# Patient Record
Sex: MALE | Race: WHITE | NOT HISPANIC OR LATINO | Employment: OTHER | URBAN - METROPOLITAN AREA
[De-identification: names, ages, dates, MRNs, and addresses within clinical notes are randomized per-mention and may not be internally consistent; named-entity substitution may affect disease eponyms.]

---

## 2023-11-29 NOTE — PROGRESS NOTES
PT Evaluation     Today's date: 2023  Patient name: Lani Duval  : 1955  MRN: [id number]  Referring provider: Meredith Storey  Dx:   Encounter Diagnosis     ICD-10-CM    1. Benign paroxysmal positional vertigo, unspecified laterality  H81.10           Start Time: 1500  Stop Time: 1553  Total time in clinic (min): 53 minutes    Assessment  Assessment details:  76 yo male referred to PT for BPPV. Pt with h/o previous BPPV treated by this therapist a few years ago. His sx are described as vertigo when he rolls in bed R>L and when he bends forward and stands up quickly. Pt clinically presents with + R [place] Hallpike indicating R post canalisthiasis BPPV. He does have a lesser/shorter duration response w/ L [place] Hallpike and roll testing. Performed R Epley x 1. Through oculomotor and vestibular assessment, both fixated and unfixated w/ video goggles, pt was noted to potentially have a R UVH as well (+R HIT and +head shaking test w/ L beating nystagmus). Pt would benefit from PT at this time to re-assess response to today's CRT and for possible treatment of any other canal/ further BPPV sx and/or [facility] sx so that he  may confidently return to his PLOF safely and at premorbid intensity and duration.       Other impairment: positional dizziness    Symptom irritability: moderate  Goals  Pt will achieve the following goals in the next 2 weeks  STG 23  Indep with current HEP  Educate patients on safety and how to avoid injury due to disequilibrium or vertigo   Educate patients on vestibular pathology      Pt will achieve the following goals by d/c (8-12 weeks or as stated in plan)  LTG  Indep and compliant with current HEP in order to maximize gains achieved in therapy  Resolve all positional dizziness/BPPV sx in order to resume all activities confidently without limitation  Restore and/or strengthen balance and vestibular strategies including gaze stabilization to decrease disequilibrium and allow for safer and easier daily functional mobility with caring for his wife, household duties, ADLs, and community outings    Plan  Patient would benefit from: skilled physical therapy  Planned therapy interventions: canalith repositioning, neuromuscular re-education, patient education and home exercise program  Other planned therapy interventions: gaze stabilization training prn  Frequency: 2x week  Duration in visits: 8  Duration in weeks: 4  Plan of Care beginning date: 11/30/2023  Plan of Care expiration date: 12/28/2023  Treatment plan discussed with: patient        Subjective Evaluation    History of Present Illness  Date of onset: 11/26/2023  Mechanism of injury: 11/30/23 Eval-pt presented to ED 11/28/23 w/ 2 day h/o of positional vertigo. Dx w/ R post canalisthiasis BPPV and Epley maneuver performed x 2. Pt presents to PT for further eval    Pt initialy felt good after CRT in ED, but dizziness returned the next morning. Notices sx when rolls (he thinks )L when gets oob, resolves fairly quick, but then feels  disequilibrium. [address] [employer] [email or web address] [address], going about daily activities, but avoiding bending forward or certain positions due to hesitancy of sx return. Did experience sx when sleeping turning R. Denies HL/ear fullness;  denies falls  ABC scale 90.63%  Patient Goals  Patient goal: resolved dizziness/spinning when moves in bed  Pain  No pain reported  Exacerbated by: rolling in bed. Social Support  Stairs in house: yes   Lives in: multiple-level home  Lives with: spouse    Employment status: not working  Treatments  Current treatment: physical therapy        Objective     Active Range of Motion   Cervical/Thoracic Spine       Cervical    Flexion: Neck active flexion: WFL. Extension: Neck active extension: WFL. Left lateral flexion: Neck active lateral bend left: min dec. Right lateral flexion: Neck active lateral bend right: min dec. Left rotation: Neck active rotation left: min dec.   Right rotation: Neck active rotation right: min dec.      Neuro Exam:     Oculomotor exam   Oculomotor ROM: WNL and fixated and unfixated w/ video goggles  Resting nystagmus: fixated and unfixated w/ video goggles  Resting nystagmus: not present   Gaze holding nystagmus: not present left  (fixated and unfixated w/ video goggles) and not present right (fixated and unfixated w/ video goggles)  Smooth pursuits: within normal limits  Vertical saccades: normal  Horizontal saccades: hypermetric and to the R  Convergence: L eye surgery at age 6 due to Amblyopia  Convergence: abnormal  Cover test: normal  Head thrust: left normal  Head thrust: right abnormal    Positional testing   Louis-Hallpike   Left posterior canal: symptomatic, torsional and upbeating  Right posterior canal: symptomatic, torsional and upbeating  Roll test   Left horizontal canal: symptomatic  Right horizontal canal: symptomatic  Positional testing comment: Oculomotor Additional Notes:  Head shake test (unfixated with video goggles):  Hor-positive L beating nystagmus  Tragal Pressure-Right-not tested Left-not tested  VOR Cx -normal             Precautions: none       11/30/23            Manuals #1 #2             Try D-H to neutral 1st (Post canal) then below table (ant canal if neg neutral) check reverse upon sit up;                                                      Neuro Re-Ed             Vestibular educa AE            D-H R +  L + less response            Epley X1 R                                                                             Ther Ex                                                                                                                     Ther Activity                                       Gait Training                                       Modalities

## 2023-11-30 ENCOUNTER — EVALUATION (OUTPATIENT)
Age: 68
End: 2023-11-30
Payer: COMMERCIAL

## 2023-11-30 DIAGNOSIS — H81.10 BENIGN PAROXYSMAL POSITIONAL VERTIGO, UNSPECIFIED LATERALITY: Primary | ICD-10-CM

## 2023-11-30 PROCEDURE — 97162 PT EVAL MOD COMPLEX 30 MIN: CPT

## 2023-11-30 PROCEDURE — 97112 NEUROMUSCULAR REEDUCATION: CPT

## 2023-11-30 RX ORDER — AMOXICILLIN 500 MG/1
500 CAPSULE ORAL EVERY 8 HOURS SCHEDULED
COMMUNITY

## 2023-11-30 RX ORDER — ATORVASTATIN CALCIUM 10 MG/1
10 TABLET, FILM COATED ORAL DAILY
COMMUNITY

## 2023-11-30 NOTE — LETTER
2023    [address] [place] [id number]    Patient: Rob Gonzalez   YOB: 1955   Date of Visit: 2023     Encounter Diagnosis     ICD-10-CM    1. Benign paroxysmal positional vertigo, unspecified laterality  H81.10           Dear Dr. Pizarro Duty: Thank you for your recent referral of Rob Gonzalez. Please review the attached evaluation summary from Mike's recent visit. Please verify that you agree with the plan of care by signing the attached order. If you have any questions or concerns, please do not hesitate to call. I sincerely appreciate the opportunity to share in the care of one of your patients and hope to have another opportunity to work with you in the near future. Sincerely,    Kathrin March, PT      Referring Provider:      I certify that I have read the below Plan of Care and certify the need for these services furnished under this plan of treatment while under my care. [address] [place] [id number]  Via Fax: [phone]          PT Evaluation     Today's date: 2023  Patient name: Rob Gonzalez  : 1955  MRN: [id number]  Referring provider: Marimar Everett  Dx:   Encounter Diagnosis     ICD-10-CM    1. Benign paroxysmal positional vertigo, unspecified laterality  H81.10           Start Time: 1500  Stop Time: 1553  Total time in clinic (min): 53 minutes    Assessment  Assessment details:  76 yo male referred to PT for BPPV. Pt with h/o previous BPPV treated by this therapist a few years ago. His sx are described as vertigo when he rolls in bed R>L and when he bends forward and stands up quickly. Pt clinically presents with + R Louis Hallpike indicating R post canalisthiasis BPPV. He does have a lesser/shorter duration response w/ L Louis Hallpike and roll testing. Performed R Epley x 1.   Through oculomotor and vestibular assessment, both fixated and unfixated w/ video goggles, pt was noted to potentially have a R UVH as well (+R HIT and +head shaking test w/ L beating nystagmus). Pt would benefit from PT at this time to re-assess response to today's CRT and for possible treatment of any other canal/ further BPPV sx and/or [facility] sx so that he  may confidently return to his PLOF safely and at premorbid intensity and duration. Other impairment: positional dizziness    Symptom irritability: moderate  Goals  Pt will achieve the following goals in the next 2 weeks  STG 11/30/23  Indep with current HEP  Educate patients on safety and how to avoid injury due to disequilibrium or vertigo   Educate patients on vestibular pathology      Pt will achieve the following goals by d/c (8-12 weeks or as stated in plan)  LTG  Indep and compliant with current HEP in order to maximize gains achieved in therapy  Resolve all positional dizziness/BPPV sx in order to resume all activities confidently without limitation  Restore and/or strengthen balance and vestibular strategies including gaze stabilization to decrease disequilibrium and allow for safer and easier daily functional mobility with caring for his wife, household duties, ADLs, and community outings    Plan  Patient would benefit from: skilled physical therapy  Planned therapy interventions: canalith repositioning, neuromuscular re-education, patient education and home exercise program  Other planned therapy interventions: gaze stabilization training prn  Frequency: 2x week  Duration in visits: 8  Duration in weeks: 4  Plan of Care beginning date: 11/30/2023  Plan of Care expiration date: 12/28/2023  Treatment plan discussed with: patient        Subjective Evaluation    History of Present Illness  Date of onset: 11/26/2023  Mechanism of injury: 11/30/23 Eval-pt presented to ED 11/28/23 w/ 2 day h/o of positional vertigo. Dx w/ R post canalisthiasis BPPV and Epley maneuver performed x 2.   Pt presents to PT for further eval    Pt initialy felt good after CRT in ED, but dizziness returned the next morning. Notices sx when rolls (he thinks )L when gets oob, resolves fairly quick, but then feels  disequilibrium. [address] [employer] driving, going about daily activities, but avoiding bending forward or certain positions due to hesitancy of sx return. Did experience sx when sleeping turning R. Denies HL/ear fullness;  denies falls  ABC scale 90.63%  Patient Goals  Patient goal: resolved dizziness/spinning when moves in bed  Pain  No pain reported  Exacerbated by: rolling in bed. Social Support  Stairs in house: yes   Lives in: multiple-level home  Lives with: spouse    Employment status: not working  Treatments  Current treatment: physical therapy        Objective     Active Range of Motion   Cervical/Thoracic Spine       Cervical    Flexion: Neck active flexion: WFL. Extension: Neck active extension: WFL. Left lateral flexion: Neck active lateral bend left: min dec. Right lateral flexion: Neck active lateral bend right: min dec. Left rotation: Neck active rotation left: min dec. Right rotation: Neck active rotation right: min dec.      Neuro Exam:     Oculomotor exam   Oculomotor ROM: WNL and fixated and unfixated w/ video goggles  Resting nystagmus: fixated and unfixated w/ video goggles  Resting nystagmus: not present   Gaze holding nystagmus: not present left  (fixated and unfixated w/ video goggles) and not present right (fixated and unfixated w/ video goggles)  Smooth pursuits: within normal limits  Vertical saccades: normal  Horizontal saccades: hypermetric and to the R  Convergence: L eye surgery at age 6 due to Amblyopia  Convergence: abnormal  Cover test: normal  Head thrust: left normal  Head thrust: right abnormal    Positional testing   [place]-Hallpike   Left posterior canal: symptomatic, torsional and upbeating  Right posterior canal: symptomatic, torsional and upbeating  Roll test   Left horizontal canal: symptomatic  Right horizontal canal: symptomatic  Positional testing comment: Oculomotor Additional Notes:  Head shake test (unfixated with video goggles):  Hor-positive L beating nystagmus  Tragal Pressure-Right-not tested Left-not tested  VOR Cx -normal             Precautions: none       11/30/23            Manuals #1 #2             Try D-H to neutral 1st (Post canal) then below table (ant canal if neg neutral) check reverse upon sit up;                                                      Neuro Re-Ed             Vestibular educa AE            D-H R +  L + less response            Epley X1 R                                                                             Ther Ex                                                                                                                     Ther Activity                                       Gait Training                                       Modalities

## 2023-12-01 ENCOUNTER — OFFICE VISIT (OUTPATIENT)
Age: 68
End: 2023-12-01
Payer: COMMERCIAL

## 2023-12-01 DIAGNOSIS — H81.10 BENIGN PAROXYSMAL POSITIONAL VERTIGO, UNSPECIFIED LATERALITY: Primary | ICD-10-CM

## 2023-12-01 PROCEDURE — 97112 NEUROMUSCULAR REEDUCATION: CPT

## 2023-12-01 NOTE — PROGRESS NOTES
Daily Note     Today's date: 2023  Patient name: Rob Gonzalez  : 1955  MRN: [id number]  Referring provider: Marimar Everett  Dx:   Encounter Diagnosis     ICD-10-CM    1. Benign paroxysmal positional vertigo, unspecified laterality  H81.10           Start Time: 1052  Stop Time: 1130  Total time in clinic (min): 38 minutes    Subjective: pt reports much worst.  Noted dec R ear hearing today and isn't able to walk steady. Objective: See treatment diary below; negative [place]-Hallpike R and L;  roll test w/ R upbeating torsional nystagmus in R roll only w/ sx. Negative S/L test B as well. Assessment: Pt tolerated today's treatment session fair  -pt very unsteady w/ gait-required supervision in/out of session. Negative postional testing w/ abnormal roll test response (only in R rotation and torsional v. Jcarlos or ageotropic). Due to pt's negative positional testing and new onset of gait unsteadiness, disequilibrium/dizziness, & dec in R hearing (as well as +R HIT and L beating nystagmus w/ unfixated head shake test upon eval), this clinician suspects pt to have labyrinthitis. I phoned Dr. Osman Eng (pt's PCP) and relayed my findings. Dr Osman Eng stated her office would reach out to the pt.     Plan:  await status after pt f/u w/ Dr. Osman Eng     Precautions: none       23           Manuals #1 #2                                                                 Neuro Re-Ed             Vestibular educa AE AE           D-H R +  L + less response negativeB           Epley X1 R -           S/l test R/L - Negative B           Roll test - R upbeathing torsional nystagmus in R rotation/roll only                                                  Ther Ex                                                                                                                     Ther Activity                                       Gait Training                                       Modalities

## 2023-12-05 ENCOUNTER — APPOINTMENT (OUTPATIENT)
Age: 68
End: 2023-12-05
Payer: COMMERCIAL

## 2023-12-05 ENCOUNTER — OFFICE VISIT (OUTPATIENT)
Age: 68
End: 2023-12-05
Payer: COMMERCIAL

## 2023-12-05 DIAGNOSIS — H81.10 BENIGN PAROXYSMAL POSITIONAL VERTIGO, UNSPECIFIED LATERALITY: Primary | ICD-10-CM

## 2023-12-05 PROCEDURE — 97112 NEUROMUSCULAR REEDUCATION: CPT

## 2023-12-05 NOTE — PROGRESS NOTES
Daily Note     Today's date: 2023  Patient name: Antonia Arthur  : 1955  MRN: [id number]  Referring provider: Beni Browne MD  Dx:   Encounter Diagnosis     ICD-10-CM    1. Benign paroxysmal positional vertigo, unspecified laterality  H81.10           Start Time: 1630  Stop Time: 1650  Total time in clinic (min): 20 minutes    Subjective: pt reports feeling better. Stated Dr. Paz Dick (PCP) put pt on 3 day steroid taper last week (finished now). Saw PCP yesterday and given script for meclizine "just in case". Pt also given a script for another med (abx?) that he couldn't rememeber the name and it wasn't noted in pt's chart. Pt referred to  ENT ( appt) for further assessment. Objective: See treatment diary below      Assessment: discussed w/ pt that his sx and response appear indicative of vestibular neuritis (as noted in last treatment note) and that he may still feel residual disequilibrium/floating feeling as well as fatigue while recovering. Pt to return to this clinic next week (to allow for further recovery) for re-assessment for BPPV and/or any residual impacts from this last vestibular episode. Requested pt not take meclizine up to 24 hours prior  to next PT appt unless he is having dizziness.         Plan: Continue per plan of care. -resume next week     Precautions: none       23          Manuals #1 #2 #3                                                                Neuro Re-Ed             Vestibular educa AE AE AE          D-H R +  L + less response negativeB           Epley X1 R -           S/l test R/L - Negative B           Roll test - R upbeathing torsional nystagmus in R rotation/roll only                                                  Ther Ex                                                                                                                     Ther Activity                                       Gait Training Modalities

## 2023-12-08 NOTE — PROGRESS NOTES
Daily Note     Today's date: 2023  Patient name: Mike Villeda  : 1955  MRN: [id number]  Referring provider: Jay Jay Mendoza MD  Dx:   Encounter Diagnosis     ICD-10-CM    1. Benign paroxysmal positional vertigo, unspecified laterality  H81.10                      Subjective: ***      Objective: See treatment diary below      Assessment: Pt tolerated today's treatment session {AE treatment tatum:96141}   Initiated *** today during session and patient education provided on *** .  Pt challenged with ***. Pt completed exer with {AEtreatmentquality:10122}.  Pt felt positive relief of sx with ***.   Pt continues to benefit from skilled PT services. Will continue to encourage HEP and PT attendance while addressing pt's  functional deficits & focusing on progression of POC as patient tolerates.       Plan: {PLAN:[id number]}     Precautions: none       23         Manuals #1 #2 #3 #4               D-H/roll test;  HIT                                                  Neuro Re-Ed             Vestibular educa AE AE AE          D-H R +  L + less response negativeB           Epley X1 R -           S/l test R/L - Negative B           Roll test - R upbeathing torsional nystagmus in R rotation/roll only                                                  Ther Ex                                                                                                                     Ther Activity                                       Gait Training                                       Modalities

## 2023-12-13 ENCOUNTER — OFFICE VISIT (OUTPATIENT)
Age: 68
End: 2023-12-13
Payer: COMMERCIAL

## 2023-12-13 DIAGNOSIS — H81.10 BENIGN PAROXYSMAL POSITIONAL VERTIGO, UNSPECIFIED LATERALITY: Primary | ICD-10-CM

## 2023-12-13 PROCEDURE — 97112 NEUROMUSCULAR REEDUCATION: CPT

## 2023-12-13 NOTE — PROGRESS NOTES
Daily Note     Today's date: 2023  Patient name: Roly Catalan  : 1955  MRN: [id number]  Referring provider: Darryle Reedy, MD  Dx:   Encounter Diagnosis     ICD-10-CM    1. Benign paroxysmal positional vertigo, unspecified laterality  H81.10           Start Time: 947  Stop Time: 1020  Total time in clinic (min): 33 minutes    Subjective: pt reports feeling better, but still has occasional "feeling" when turning his head)      Objective: See treatment diary below;  DVA 6 line difference (ETDRS Chart 1; glasses on in sitting)      Assessment: Pt tolerated today's treatment session well   Negative positional testing for BPPV. Pt w/ 6 line difference w/ DVA testing indicating potential gaze stabilization dysfunction which correlates w/ his subjective complaint. Initiated VOR x 1 hor/vert in sitting x 45" each  today during session and patient education provided on HEP (5x/day) . Pt completed exer with no adverse reactions  . Pt continues to benefit from skilled PT services. Will continue to encourage HEP and PT attendance while addressing pt's  functional deficits & focusing on progression of POC as patient tolerates. Plan: Continue per plan of care. Precautions: none       23         Manuals #1 #2 #3 #4                                                               Neuro Re-Ed             Vestibular educa AE AE AE AE         D-H R +  L + less response negativeB  negative         Epley X1 R -           S/l test R/L - Negative B           Roll test - R upbeathing torsional nystagmus in R rotation/roll only  negative         VOR x 1 hor/vert - - - Sit x 45" ea Stand?                                   Ther Ex                                                                                                                     Ther Activity                                       Gait Training                                       Modalities

## 2023-12-14 ENCOUNTER — APPOINTMENT (OUTPATIENT)
Age: 68
End: 2023-12-14
Payer: COMMERCIAL

## 2023-12-14 DIAGNOSIS — H81.10 BENIGN PAROXYSMAL POSITIONAL VERTIGO, UNSPECIFIED LATERALITY: Primary | ICD-10-CM

## 2023-12-20 NOTE — PROGRESS NOTES
"Daily Note     Today's date: 2023  Patient name: Mike Villeda  : 1955  MRN: [id number]  Referring provider: Jay Jay Mendoza MD  Dx:   Encounter Diagnosis     ICD-10-CM    1. Benign paroxysmal positional vertigo, unspecified laterality  H81.10           Start Time: 0800  Stop Time: 0840  Total time in clinic (min): 40 minutes    Subjective: pt reports he was able to look up at some birds and didn't have any dizziness/sx.  However, reports some slight dizziness x a couple days when turning (L to R) in bed.  Has been doing VOR x 1, but only hor, with his thumbnail, and for ~ 30\" (didn't time)      Objective: See treatment diary below      Assessment: Pt tolerated today's treatment session well    Negative Roll test and [place] Hallpike R/L.  Initiated VOR x 1 hor/vert in standing in addition to sitting. today during session.Pt completed exer with  some inc in sx (lightheadedness/disequilibrium), but recovered w/in a few minutes .  Pt required education/VPs for proper execution of VOR x 1 -including doing it vert which he hadn't been and using a small size, more delineated object.  Pt also instructed to use timer instead of estimating time himself.  Discussed, again, the idea that he is strengthening his vestibular system (for gaze stabilization) and that his sx are a part of that process (as muscle soreness would be when initiating a strengthening program for weak legs).  However, did instruct pt that if sx last >30' after the exer, then he should dec the amount of time performed (I.e. dec to 45\" from 60\").  Pt continues to benefit from skilled PT services. Will continue to encourage HEP and PT attendance while addressing pt's  functional deficits & focusing on progression of POC as patient tolerates.       Plan: Continue per plan of care.      Precautions: none         23        Manuals #1 #2 #3 #4 #5                                                              Neuro " "Re-Ed             Vestibular educa AE AE AE AE AE        D-H R +  L + less response negativeB  negative negative        Epley X1 R -           S/l test R/L - Negative B           Roll test - R upbeathing torsional nystagmus in R rotation/roll only  negative negative        VOR x 1 hor/vert - - - Sit x 45\" ea Stand x60\" elliott                                  Ther Ex                                                                                                                     Ther Activity                                       Gait Training                                       Modalities                                                  " Secondary Defect Width (In Cm): 4

## 2023-12-22 ENCOUNTER — OFFICE VISIT (OUTPATIENT)
Age: 68
End: 2023-12-22
Payer: COMMERCIAL

## 2023-12-22 DIAGNOSIS — H81.10 BENIGN PAROXYSMAL POSITIONAL VERTIGO, UNSPECIFIED LATERALITY: Primary | ICD-10-CM

## 2023-12-22 PROCEDURE — 97112 NEUROMUSCULAR REEDUCATION: CPT

## 2023-12-22 NOTE — PROGRESS NOTES
Daily Note and Discharge     Today's date: 2023  Patient name: Mike Villeda  : 1955  MRN: [id number]  Referring provider: Jay Jay Mendoza MD  Dx:   Encounter Diagnosis     ICD-10-CM    1. Benign paroxysmal positional vertigo, unspecified laterality  H81.10       2. Vestibular hypofunction, unspecified laterality  H83.2X9           Start Time: 1130  Stop Time: 1145  Total time in clinic (min): 15 minutes    Subjective: pt feeling good-no recurrences or even disequilibrium events.  Still able to l/u w/ head w/o sx as well as roll in bed (had to do so suddenly the other night and had no sx).  Still notices R HL, though this has been deteriorating for years.        Objective: See treatment diary below; ABC scale 100%      Assessment: Pt tolerated today's treatment session well  .  W/o further dizziness/disequilibrium issues at this time.  ABC scale 100%.  Pt has achieved all goals.  Discussed w/ pt that he discuss his HL w/ the ENT at his appt in 2024-pt in agreement  Goals  Pt will achieve the following goals in the next 2 weeks  STG 23  Indep with current HEP (met)  Educate patients on safety and how to avoid injury due to disequilibrium or vertigo (met)  Educate patients on vestibular pathology (met)        Pt will achieve the following goals by d/c (8-12 weeks or as stated in plan)  LTG  Indep and compliant with current HEP in order to maximize gains achieved in therapy (met)  Resolve all positional dizziness/BPPV sx in order to resume all activities confidently without limitation (met)  Restore and/or strengthen balance and vestibular strategies including gaze stabilization to decrease disequilibrium and allow for safer and easier daily functional mobility with caring for his wife, household duties, ADLs, and community outings (met)  Plan:  d/c from formal PT -pt in agreement w/ this plan     Precautions: none         23       Manuals #1 #2  "#3 #4 #5                                                              Neuro Re-Ed             Vestibular educa AE AE AE AE AE AE       D-H R +  L + less response negativeB  negative negative        Epley X1 R -           S/l test R/L - Negative B           Roll test - R upbeathing torsional nystagmus in R rotation/roll only  negative negative        VOR x 1 hor/vert - - - Sit x 45\" ea Stand x60\" ea                                  Ther Ex                                                                                                                     Ther Activity                                       Gait Training                                       Modalities                                                    "

## 2023-12-29 ENCOUNTER — OFFICE VISIT (OUTPATIENT)
Age: 68
End: 2023-12-29
Payer: COMMERCIAL

## 2023-12-29 DIAGNOSIS — H81.10 BENIGN PAROXYSMAL POSITIONAL VERTIGO, UNSPECIFIED LATERALITY: Primary | ICD-10-CM

## 2023-12-29 DIAGNOSIS — H83.2X9 VESTIBULAR HYPOFUNCTION, UNSPECIFIED LATERALITY: ICD-10-CM

## 2023-12-29 PROCEDURE — 97112 NEUROMUSCULAR REEDUCATION: CPT

## 2024-07-08 NOTE — PROGRESS NOTES
PT Evaluation     Today's date: 2024  Patient name: Mike Villeda  : 1955  MRN: [id number]  Referring provider: Chantelle Argueta DO  Dx:   Encounter Diagnosis     ICD-10-CM    1. Vertigo  R42           Start Time: 1730  Stop Time:   Total time in clinic (min): 55 minutes    Assessment  Other impairment: dizziness w/ positional changes    Assessment details:  68 yo male referred to PT for BPPV.  Pt with h/o previous labyrinthitis 2023 and also h/o previous R post canalisthiasis BPPV episodes.  His current sx are described as general unsteadiness/disequilibrium w/ walking/mobility after initial vertigo episode getting OOB 2 days ago and 1-2 episodes of dizziness w/ position changes in bed (pt unsure what direction). Pt clinically presents with negative [place]-Hallpike R/L and negative L Roll test position, but + upbeating torsional nystagmus and sx <20 seconds duration w/ R Roll Test position. These signs/sx not definitive for dx which canal, however, due to pt's h/o R post canalisthiasis and the R torsional upbeating nystagmus in cerv R Rot, a preliminary dx of R post canalisthiasis assumed; therefore, performed R Epley x 2. Pt would benefit from PT at this time to re-assess response to today's CRT and for possible treatment of any other canal/ further BPPV sx so that he  may confidently return to his PLOF safely and at premorbid intensity and duration.        Goals  Pt will achieve the following goals in the next 2 weeks  STG 24  Indep with current HEP   Educate patients on safety and how to avoid injury due to disequilibrium or vertigo   Educate patients on vestibular pathology     LTG  Indep and compliant with current HEP in order to maximize gains achieved in therapy   Resolve all positional dizziness/BPPV sx in order to resume all activities confidently without limitation   Restore and/or strengthen balance and vestibular strategies including gaze stabilization to decrease disequilibrium  "and allow for safer and easier daily functional mobility with caring for his wife, household duties, ADLs, and community outings       Plan  Patient would benefit from: skilled physical therapy    Frequency: 1-2x week  Duration in weeks: 6  Plan of Care beginning date: 7/9/2024  Plan of Care expiration date: 8/20/2024  Treatment plan discussed with: patient      Subjective Evaluation    History of Present Illness  Date of onset: 7/7/2024  Mechanism of injury: 7/9/24 Eval- pt known to this PT due to multiple treatments over the years for BPPV related vertigo and labyrinthitis bout ~ December 2023. Pt did f/u w/ ENT following this bout (treated w/ steroid taper by PCP). Pt's ongoing HL was deemed \"normal for his age and he is a hearing aid candidate\".  MRI ordered to rule out acoustic neuroma/abnormality and was negative.    Pt present today with new onset of dizziness.  Pt played [employer] at [employer] last week in the heat.  After last show (7/7/24), awoke during the night and had onset of vertigo when sat up.  Has been out of balance since. Some inc vertigo when in bed, but unsteady with walking.  current sx are described as general unsteadiness/disequilibrium w/ walking/mobility after initial vertigo episode getting OOB 2 days ago and 1-2 episodes of dizziness w/ position changes in bed (pt unsure what direction)Avoided driving this week , but better now. Still needs to hold on at times when walking. Feels yucky  No drastic changes in R ear hearing from baseline last admission as per pt report.    ABC scale 86.88%  Patient Goals  Patient goal: get rid of dizziness; feel better and move safely  Pain  No pain reported    Social Support  Stairs in house: yes   Lives in: multiple-level home  Lives with: spouse (wife is w/c bound)    Employment status: not working      Objective   Neuro Exam:     Oculomotor exam   Oculomotor ROM: WNL and fixated and unfixated w/ video goggles  Resting nystagmus: fixated and " unfixated w/ video goggles  Resting nystagmus: not present   Gaze holding nystagmus: not present left  (fixated and unfixated w/ video goggles) and not present right (fixated and unfixated w/ video goggles)  Smooth pursuits: within normal limits  Vertical saccades: normal  Horizontal saccades: hypermetric and to Right  Head thrust: left normal and right normal    Positional testing   [place]-Hallpike   Left posterior canal: WNL  Right posterior canal: WNL  Roll test   Left horizontal canal: WNL  Roll test comments: sx/signs  only w/ R  Positional testing comment:   Oculomotor Additional Notes:  Head shake test (unfixated with video goggles):  Hor-negative   Tragal Pressure-Right-not tested Left-not tested  VOR Cx -normal             Precautions: R HL       7/9/24            Manuals                                                                 Neuro Re-Ed             Vestibular educ/safety AE            R epley X 2             R upbeating torsional in R roll test position (negative D-H)                                                                Ther Ex                                                                                                                     Ther Activity                                       Gait Training                                       Modalities

## 2024-07-09 ENCOUNTER — EVALUATION (OUTPATIENT)
Age: 69
End: 2024-07-09
Payer: COMMERCIAL

## 2024-07-09 DIAGNOSIS — R42 VERTIGO: Primary | ICD-10-CM

## 2024-07-09 PROCEDURE — 97162 PT EVAL MOD COMPLEX 30 MIN: CPT

## 2024-07-09 PROCEDURE — 97112 NEUROMUSCULAR REEDUCATION: CPT

## 2024-07-09 NOTE — LETTER
July 10, 2024    Chantelle Argueta DO  [address]  [address]  [facility]     Patient: Mike Villeda   YOB: 1955   Date of Visit: 2024     Encounter Diagnosis     ICD-10-CM    1. Vertigo  R42           Dear Dr. Argueta:    Thank you for your recent referral of Mike Villeda. Please review the attached evaluation summary from Mike's recent visit.     Please verify that you agree with the plan of care by signing the attached order.     If you have any questions or concerns, please do not hesitate to call.     I sincerely appreciate the opportunity to share in the care of one of your patients and hope to have another opportunity to work with you in the near future.       Sincerely,    Mamie Ponce, PT      Referring Provider:      I certify that I have read the below Plan of Care and certify the need for these services furnished under this plan of treatment while under my care.                    Chantelle Argueta DO  [address]  [address]  [facility]   Via Fax: [phone]          PT Evaluation     Today's date: 2024  Patient name: Mike Villeda  : 1955  MRN: [id number]  Referring provider: Chantelle Argueta DO  Dx:   Encounter Diagnosis     ICD-10-CM    1. Vertigo  R42           Start Time: 1730  Stop Time:   Total time in clinic (min): 55 minutes    Assessment  Other impairment: dizziness w/ positional changes    Assessment details:  68 yo male referred to PT for BPPV.  Pt with h/o previous labyrinthitis 2023 and also h/o previous R post canalisthiasis BPPV episodes.  His current sx are described as general unsteadiness/disequilibrium w/ walking/mobility after initial vertigo episode getting OOB 2 days ago and 1-2 episodes of dizziness w/ position changes in bed (pt unsure what direction). Pt clinically presents with negative [place]-Hallpike R/L and negative L Roll test position, but + upbeating torsional nystagmus and sx <20 seconds duration w/ R  "Roll Test position. These signs/sx not definitive for dx which canal, however, due to pt's h/o R post canalisthiasis and the R torsional upbeating nystagmus in cerv R Rot, a preliminary dx of R post canalisthiasis assumed; therefore, performed R Epley x 2. Pt would benefit from PT at this time to re-assess response to today's CRT and for possible treatment of any other canal/ further BPPV sx so that he  may confidently return to his PLOF safely and at premorbid intensity and duration.        Goals  Pt will achieve the following goals in the next 2 weeks  STG 7/9/24  Indep with current HEP   Educate patients on safety and how to avoid injury due to disequilibrium or vertigo   Educate patients on vestibular pathology     LTG  Indep and compliant with current HEP in order to maximize gains achieved in therapy   Resolve all positional dizziness/BPPV sx in order to resume all activities confidently without limitation   Restore and/or strengthen balance and vestibular strategies including gaze stabilization to decrease disequilibrium and allow for safer and easier daily functional mobility with caring for his wife, household duties, ADLs, and community outings       Plan  Patient would benefit from: skilled physical therapy    Frequency: 1-2x week  Duration in weeks: 6  Plan of Care beginning date: 7/9/2024  Plan of Care expiration date: 8/20/2024  Treatment plan discussed with: patient      Subjective Evaluation    History of Present Illness  Date of onset: 7/7/2024  Mechanism of injury: 7/9/24 Eval- pt known to this PT due to multiple treatments over the years for BPPV related vertigo and labyrinthitis bout ~ December 2023. Pt did f/u w/ ENT following this bout (treated w/ steroid taper by PCP). Pt's ongoing HL was deemed \"normal for his age and he is a hearing aid candidate\".  MRI ordered to rule out acoustic neuroma/abnormality and was negative.    Pt present today with new onset of dizziness.  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  After last show (7/7/24), awoke during the night and had onset of vertigo when sat up.  Has been out of balance since. Some inc vertigo when in bed, but unsteady with walking.  current sx are described as general unsteadiness/disequilibrium w/ walking/mobility after initial vertigo episode getting OOB 2 days ago and 1-2 episodes of dizziness w/ position changes in bed (pt unsure what direction)Avoided driving this week , but better now. Still needs to hold on at times when walking. Feels yucky  No drastic changes in R ear hearing from baseline last admission as per pt report.    ABC scale 86.88%  Patient Goals  Patient goal: get rid of dizziness; feel better and move safely  Pain  No pain reported    Social Support  Stairs in house: yes   Lives in: multiple-level home  Lives with: spouse (wife is w/c bound)    Employment status: not working      Objective   Neuro Exam:     Oculomotor exam   Oculomotor ROM: WNL and fixated and unfixated w/ video goggles  Resting nystagmus: fixated and unfixated w/ video goggles  Resting nystagmus: not present   Gaze holding nystagmus: not present left  (fixated and unfixated w/ video goggles) and not present right (fixated and unfixated w/ video goggles)  Smooth pursuits: within normal limits  Vertical saccades: normal  Horizontal saccades: hypermetric and to Right  Head thrust: left normal and right normal    Positional testing   Louis-Hallpike   Left posterior canal: WNL  Right posterior canal: WNL  Roll test   Left horizontal canal: WNL  Roll test comments: sx/signs  only w/ R  Positional testing comment:   Oculomotor Additional Notes:  Head shake test (unfixated with video goggles):  Hor-negative   Tragal Pressure-Right-not tested Left-not tested  VOR Cx -normal             Precautions: R HL       7/9/24            Manuals                                                                 Neuro Re-Ed             Vestibular educ/safety AE             R epley X 2             R upbeating torsional in R roll test position (negative D-H)                                                                Ther Ex                                                                                                                     Ther Activity                                       Gait Training                                       Modalities

## 2024-07-10 NOTE — PROGRESS NOTES
"Daily Note     Today's date: 2024  Patient name: Mike Villeda  : 1955  MRN: [id number]  Referring provider: Chantelle Argueta DO  Dx:   Encounter Diagnosis     ICD-10-CM    1. Vertigo  R42           Start Time: 0950  Stop Time: 1030  Total time in clinic (min): 40 minutes    Subjective: had dizziness when lying down supine at dr. [employer] yesterday, but otherwise no severe vertigo.      Objective: See treatment diary below      Assessment: pt w/o signs/sx w/ [place] -Hallpike R/L, deep head hang, s/l test R/L, or Roll Test-however describes he doesn't feel good when turned to the R.  Performed BBQ roll and Kurtzer Hybrid Maneuver for horizonal canal canalisthiasis (R).   pt w/o sx upon completion of session. Pt continues to benefit from skilled PT services. Will continue to encourage PT attendance while addressing pt's  functional deficits & focusing on progression of POC as patient tolerates.       Plan: Continue per plan of care.      Precautions: R HL       24           Manuals #1 #2                                                               Neuro Re-Ed             Vestibular educ/safety AE AE           R epley X 2 -            R upbeating torsional in R roll test position (negative D-H) Negative DH R/L and Roll test, but \"feels it\" on R; R and L s/l test negative           BBQ roll - R to L x 1 (first)           Kurtzer hybrid manuever - (Start R side) x1                                                  Ther Ex                                                                                                                     Ther Activity                                       Gait Training                                       Modalities                                            "

## 2024-07-11 ENCOUNTER — OFFICE VISIT (OUTPATIENT)
Age: 69
End: 2024-07-11
Payer: COMMERCIAL

## 2024-07-11 DIAGNOSIS — R42 VERTIGO: Primary | ICD-10-CM

## 2024-07-11 PROCEDURE — 97112 NEUROMUSCULAR REEDUCATION: CPT

## 2024-07-15 NOTE — PROGRESS NOTES
"Daily Note     Today's date: 2024  Patient name: Mike Villeda  : 1955  MRN: [id number]  Referring provider: Chantelle Argueta DO  Dx:   Encounter Diagnosis     ICD-10-CM    1. Vertigo  R42           Start Time: 1530  Stop Time: 1600  Total time in clinic (min): 30 minutes    Subjective: No symptoms of dizziness since last PT visit.       Objective: See treatment diary below      Assessment: Completed 2x Kurtzer Hybrid maneuver.  Significant vertigo with nystagmus noted in \"position 3\" of Kurtzer maneuver first time.  Second time through maneuver minimal symptoms in position 3. Pt tolerated treatment well, had no vertigo after PT.  Plan to continue PT in 2 weeks following his vacation.    Plan: Progress treatment as tolerated.       Precautions: R HL       24          Manuals #1 #2 #3                                                              Neuro Re-Ed             Vestibular educ/safety AE AE           R epley X 2 -            R upbeating torsional in R roll test position (negative D-H) Negative DH R/L and Roll test, but \"feels it\" on R; R and L s/l test negative -          BBQ roll - R to L x 1 (first) -          Kurtzer hybrid manuever - (Start R side) x1 2x  Start R    Symptoms first roll                                                 Ther Ex                                                                                                                     Ther Activity                                       Gait Training                                       Modalities                                              "

## 2024-07-17 ENCOUNTER — OFFICE VISIT (OUTPATIENT)
Age: 69
End: 2024-07-17
Payer: COMMERCIAL

## 2024-07-17 DIAGNOSIS — R42 VERTIGO: Primary | ICD-10-CM

## 2024-07-17 PROCEDURE — 97112 NEUROMUSCULAR REEDUCATION: CPT | Performed by: PHYSICAL THERAPIST

## 2024-07-18 ENCOUNTER — OFFICE VISIT (OUTPATIENT)
Age: 69
End: 2024-07-18
Payer: COMMERCIAL

## 2024-07-18 DIAGNOSIS — R42 VERTIGO: Primary | ICD-10-CM

## 2024-07-18 PROCEDURE — 97112 NEUROMUSCULAR REEDUCATION: CPT | Performed by: PHYSICAL THERAPIST

## 2024-07-18 NOTE — PROGRESS NOTES
"Daily Note     Today's date: 2024  Patient name: Mike Villeda  : 1955  MRN: [id number]  Referring provider: Chantelle Argueta DO  Dx:   Encounter Diagnosis     ICD-10-CM    1. Vertigo  R42           Start Time: 1440  Stop Time: 1515  Total time in clinic (min): 35 minutes    Subjective: Pt report vertigo when going to bed last night, not currently dizzy, but feels \"disoriented\" today.       Objective: See treatment diary below      Assessment: + roll test R, - roll test L, - Epley R.  Progressed with 2x Kurtzer maneuver. With minimal symptoms.  Pt had vertigo in AM when he woke up this morning.  No vertigo or symptoms with canolith reporitioning today and pt felt well when he left.  Plan to continue PT with re-assess in 2 weeks.     Plan: Progress treatment as tolerated.       Precautions: R HL       24         Manuals #1 #2 #3 #4                                                             Neuro Re-Ed             Vestibular educ/safety AE AE           R epley X 2 -            R upbeating torsional in R roll test position (negative D-H) Negative DH R/L and Roll test, but \"feels it\" on R; R and L s/l test negative - Negative   Epley beginning R      Positive roll test R         BBQ roll - R to L x 1 (first) -          Kurtzer hybrid manuever - (Start R side) x1 2x  Start R    Symptoms first roll 2x R up      1x L up                                                Ther Ex                                                                                                                     Ther Activity                                       Gait Training                                       Modalities                                                "

## 2024-07-25 NOTE — PROGRESS NOTES
"Daily Note     Today's date: 2024  Patient name: Mike Villeda  : 1955  MRN: [id number]  Referring provider: Chantelle Argueta DO  Dx:   Encounter Diagnosis     ICD-10-CM    1. Vertigo  R42           Start Time: 1545  Stop Time: 1613      Subjective:  [place] and needed sutures and went to Urgent Care and while there had dizziness when lying back for PA-C.  Pt states that the PA-C prescribed Sudafed and 5 day prednisone taper  (pt also told to take the meclizine he already had)..  Pt no longer taking meds- stopped taking them b/c they produced fatigue (worried about driving home from vacation under the influence of meds).  Pt reports no dizziness since returning home w/ position changes or activity.      Objective: See treatment diary below      Assessment: Pt tolerated today's treatment session well    Negative positional testing ([place] Hallpike R and L and negative Roll test and deep head hang (unable to provoke sx).  Discussed w/ pt that we hold PT and keep chart open x ~ 2 weeks; if he has any return of sx  he is to call and schedule a return appt.       Plan:  keep chart open x ~ 2 weeks-if pt has further issues he is to call and schedule a return appt     Precautions: R HL       24        Manuals #1 #2 #3 #4 #5                                                              Neuro Re-Ed             Vestibular educ/safety AE AE   AE        R epley X 2 -            R upbeating torsional in R roll test position (negative D-H) Negative DH R/L and Roll test, but \"feels it\" on R; R and L s/l test negative - Negative   Epley beginning R      Positive roll test R Negative D-H R and L; negative roll test and negative deep head hang        BBQ roll - R to L x 1 (first) -          Kurtzer hybrid manuever - (Start R side) x1 2x  Start R    Symptoms first roll 2x R up      1x L up                                                Ther Ex                        "                                                                                              Ther Activity                                       Gait Training                                       Modalities

## 2024-07-30 ENCOUNTER — OFFICE VISIT (OUTPATIENT)
Age: 69
End: 2024-07-30
Payer: COMMERCIAL

## 2024-07-30 DIAGNOSIS — R42 VERTIGO: Primary | ICD-10-CM

## 2024-07-30 PROCEDURE — 97112 NEUROMUSCULAR REEDUCATION: CPT

## 2024-12-24 NOTE — PROGRESS NOTES
PT Evaluation     Today's date: 2024  Patient name: Mike Villeda  : 1955  MRN: [id number]  Referring provider: No ref. provider found  Dx:   Encounter Diagnosis     ICD-10-CM    1. Vestibular hypofunction of left ear  H83.2X2       2. Dizziness  R42           Start Time: 0730  Stop Time: 0840  Total time in clinic (min): 70 minutes    Assessment  Impairments: impaired balance and lacks appropriate home exercise program  Other impairment: vestibular hypofunction; dizziness  Symptom irritability: moderate    Assessment details:  70 yo male Direct Access referral to PT for dizziness that presents as UVH (unilateral vestibular hypofunction).  Pt with h/o previous R post canalisthiasis BPPV-multiple episodes over the years and L vestibular neuritis ~ 1 year ago.  His sx this episode are described as 1 day of vertigo that kept him in bed. Following this, pt w/o dizziness, but increased head mov't can cause lightheadedness and feeling unsteady. Physical exam today revealed +R beating nystagmus fixated and unfixated (w/ video goggles) at rest and with R and L gaze (3rd degree nystagmus w/ direction fixed ).   Pt demonstrated negative positional testing ([place] Hallpike and Roll Tests), normal oculomotor exam except abnormal NPC, abnormal DVA w/ 6 line difference, + L HIT (significant), and poor mCTSIB 4th condition (EC on foam).  Due to the above findings, pt presents as a L UVH (potentially not recovered from L labyrinthitis ~ 1 year ago or new onset this past week due to the fixed visibility of nystagmus in fixed direction indicating acute phase). Due to these findings, as well as from general observation in the clinic today, he doesn't  present as a fall risk currently.  Pt would benefit from PT at this time for gaze stabilization, postural stabilization, balance training, and safety education in order to confidently and safely resume his daily activities at home and in the community  Barriers to therapy:          Goals  Pt will achieve the following goals in the next 2 weeks  STG 12/27/24  Indep with current HEP  Educate patients on safety and how to avoid injury due to disequilibrium or vertigo   Educate patients on vestibular pathology  DVA 5 line difference  Narinder VOR x 1 for 60 seconds TID  hor to improve pt's vestibular endurance      Pt will achieve the following goals by d/c (8-12 weeks or as stated in plan)  LTG  Indep and compliant with current HEP in order to maximize gains achieved in therapy  Dec episodes of dizziness to <2x/week  Improve DHI score to 10 to indicate improved dizziness impact on pt's daily life  DVA 3-4 line difference to denote gross gaze stabilization improvement  Restore and/or strengthen balance, gait, endurance, functional strength, and vestibular strategies including gaze stabilization as noted through mCTSIB  in order to decrease disequilibrium and allow for safer and easier daily functional mobility with caring for his wife, household duties, ADLs, and community outings    Plan  Patient would benefit from: skilled physical therapy  Referral necessary: No    Planned therapy interventions: canalith repositioning, neuromuscular re-education, patient/caregiver education, balance and postural training  Other planned therapy interventions: gaze/postural stabilization training prn    Frequency: 1-2x week  Duration in weeks: 8  Plan of Care beginning date: 12/27/2024  Plan of Care expiration date: 2/21/2025  Treatment plan discussed with: patient        Subjective Evaluation    History of Present Illness  Date of onset: 12/24/2024  Mechanism of injury: 12/27/24 Eval- pt known to this clinic due to Pt with h/o previous R labyrinthitis 12/2023 and also h/o previous R post canalisthiasis BPPV episodes. (Pt seen multiple times over the years).  Pt states on Tuesday 12/24/24 he awoke and had onset of dizziness as he has had before.  Pt states he was extremely fatigued and feeling terrible-slept  most of that day.  Did not get dizzy next day when got up/oob.  Did fine that day. Felt off next day, but able to perform tasks and didn't have dizziness.  Currently w/o dizziness  (States wife was dizzy and vomited in past few days as well).  No change in hearing  Hasn't taken an meds for dizziness    DHI 14  ABC 96.88%  Patient Goals  Patient goal: resolve dizziness  Pain  No pain reported  Exacerbated by: increased head mov't.    Social Support  Stairs in house: yes   Lives in: multiple-level home  Lives with: spouse (wife is w/c bound)    Employment status: not working  Treatments  Current treatment: physical therapy        Objective   Neuro Exam:     Oculomotor exam   Oculomotor ROM: WNL and fixated and unfixated w/ video goggles  Resting nystagmus: present and R beating; fixated and unfixated w/ video goggles  Gaze holding nystagmus: present left (R beating less intense than R gaze; fixated and unfixated w/ video goggles) and present right (R beating; fixated and unfixated w/ video goggles)  Smooth pursuits: within normal limits and fixated and unfixated w/ video goggles  Vertical saccades: hypermetric  Horizontal saccades: normal  Convergence: abnormal (h/o vision issues w/ L eye which is eye that deviates)  Cover test: R lateral mov't; pt reports weak L eye  Cover test: abnormal  Head thrust: right normal  Head thrust: left abnormal  Dynamic visual acuity: abnormal (ETDRS chart R; sitting; glasses on 6 line difference)    Positional testing   Positional testing comment: MCTSIB performed with shoes on, feet together, and arms crossed at chest    Oculomotor Additional Notes:  Head shake test (unfixated with video goggles):  Hor-negative   Tragal Pressure-Right-not tested Left-not tested  VOR Cx -normal    [place] Hallpike and Roll test all w/ + R beating nystagmus and min to no sx (mild dizziness, not vertigo)    Functional outcomes   Functional outcome gait comment: amb indep without ADx clinic distances and  "parking lot to clinic. Pt without gait deviations noted       Balance assessments   MCTSIB   Eyes open level surface: 30 sec  min sway  Eyes open foam surface: 30 sec min sway  Eyes closed level surface: 30 sec min to mod sway  Eyes closed foam surface: 4, 4, 5 sec mod to max sway             Precautions: R HL        12/27/24            Manuals #1                                                                Neuro Re-Ed             Safety; Posture, body mechanics, energy/joint conservation; vestibular pathology educ AE            Walk w/ HM -            Stand floor:  -feet together /EC -            Stand foam:  -feet together/EO -                         VOR x 1  Hor sitting x60\" (TID) Add [employer] to 5x/day                        Pencil push ups -                                      Ther Ex                                                                                                                     Ther Activity                                       Gait Training                                       Modalities                                            "

## 2024-12-27 ENCOUNTER — EVALUATION (OUTPATIENT)
Age: 69
End: 2024-12-27
Payer: COMMERCIAL

## 2024-12-27 DIAGNOSIS — H83.2X2 VESTIBULAR HYPOFUNCTION OF LEFT EAR: Primary | ICD-10-CM

## 2024-12-27 DIAGNOSIS — R42 DIZZINESS: ICD-10-CM

## 2024-12-27 DIAGNOSIS — H81.10 BENIGN PAROXYSMAL POSITIONAL VERTIGO, UNSPECIFIED LATERALITY: ICD-10-CM

## 2024-12-27 PROCEDURE — 97112 NEUROMUSCULAR REEDUCATION: CPT

## 2024-12-27 PROCEDURE — 97162 PT EVAL MOD COMPLEX 30 MIN: CPT

## 2024-12-27 NOTE — LETTER
2024    Chantelle Argueta DO  [address]  [address]  [facility]     Patient: Mike Villeda   YOB: 1955   Date of Visit: 2024     Encounter Diagnosis     ICD-10-CM    1. Vestibular hypofunction of left ear  H83.2X2       2. Dizziness  R42           Dear Dr. Argueta:    Thank you for your recent referral of Mike Villeda. Please review the attached evaluation summary from Mike's recent visit.     Please verify that you agree with the plan of care by signing the attached order.     If you have any questions or concerns, please do not hesitate to call.     I sincerely appreciate the opportunity to share in the care of one of your patients and hope to have another opportunity to work with you in the near future.       Sincerely,    Mamie Ponce, PT      Referring Provider:      I certify that I have read the below Plan of Care and certify the need for these services furnished under this plan of treatment while under my care.                    Chantelle Argueta DO  [address]  [address]  [facility]   Via Fax: [phone]          PT Evaluation     Today's date: 2024  Patient name: Mike Villeda  : 1955  MRN: [id number]  Referring provider: No ref. provider found  Dx:   Encounter Diagnosis     ICD-10-CM    1. Vestibular hypofunction of left ear  H83.2X2       2. Dizziness  R42           Start Time: 0730  Stop Time: 0840  Total time in clinic (min): 70 minutes    Assessment  Impairments: impaired balance and lacks appropriate home exercise program  Other impairment: vestibular hypofunction; dizziness  Symptom irritability: moderate    Assessment details:  68 yo male Direct Access referral to PT for dizziness that presents as UVH (unilateral vestibular hypofunction).  Pt with h/o previous R post canalisthiasis BPPV-multiple episodes over the years and L vestibular neuritis ~ 1 year ago.  His sx this episode are described as 1 day of vertigo that  kept him in bed. Following this, pt w/o dizziness, but increased head mov't can cause lightheadedness and feeling unsteady. Physical exam today revealed +R beating nystagmus fixated and unfixated (w/ video goggles) at rest and with R and L gaze (3rd degree nystagmus w/ direction fixed ).   Pt demonstrated negative positional testing ([place] Hallpike and Roll Tests), normal oculomotor exam except abnormal NPC, abnormal DVA w/ 6 line difference, + L HIT (significant), and poor mCTSIB 4th condition (EC on foam).  Due to the above findings, pt presents as a L UVH (potentially not recovered from L labyrinthitis ~ 1 year ago or new onset this past week due to the fixed visibility of nystagmus in fixed direction indicating acute phase). Due to these findings, as well as from general observation in the clinic today, he doesn't  present as a fall risk currently.  Pt would benefit from PT at this time for gaze stabilization, postural stabilization, balance training, and safety education in order to confidently and safely resume his daily activities at home and in the community  Barriers to therapy:         Goals  Pt will achieve the following goals in the next 2 weeks  STG 12/27/24  Indep with current HEP  Educate patients on safety and how to avoid injury due to disequilibrium or vertigo   Educate patients on vestibular pathology  DVA 5 line difference  Narinder VOR x 1 for 60 seconds TID  hor to improve pt's vestibular endurance      Pt will achieve the following goals by d/c (8-12 weeks or as stated in plan)  LTG  Indep and compliant with current HEP in order to maximize gains achieved in therapy  Dec episodes of dizziness to <2x/week  Improve DHI score to 10 to indicate improved dizziness impact on pt's daily life  DVA 3-4 line difference to denote gross gaze stabilization improvement  Restore and/or strengthen balance, gait, endurance, functional strength, and vestibular strategies including gaze stabilization as noted through  mCTSIB  in order to decrease disequilibrium and allow for safer and easier daily functional mobility with caring for his wife, household duties, ADLs, and community outings    Plan  Patient would benefit from: skilled physical therapy  Referral necessary: No    Planned therapy interventions: canalith repositioning, neuromuscular re-education, patient/caregiver education, balance and postural training  Other planned therapy interventions: gaze/postural stabilization training prn    Frequency: 1-2x week  Duration in weeks: 8  Plan of Care beginning date: 12/27/2024  Plan of Care expiration date: 2/21/2025  Treatment plan discussed with: patient        Subjective Evaluation    History of Present Illness  Date of onset: 12/24/2024  Mechanism of injury: 12/27/24 Eval- pt known to this clinic due to Pt with h/o previous R labyrinthitis 12/2023 and also h/o previous R post canalisthiasis BPPV episodes. (Pt seen multiple times over the years).  Pt states on Tuesday 12/24/24 he awoke and had onset of dizziness as he has had before.  Pt states he was extremely fatigued and feeling terrible-slept most of that day.  Did not get dizzy next day when got up/oob.  Did fine that day. Felt off next day, but able to perform tasks and didn't have dizziness.  Currently w/o dizziness  (States wife was dizzy and vomited in past few days as well).  No change in hearing  Hasn't taken an meds for dizziness    DHI 14  ABC 96.88%  Patient Goals  Patient goal: resolve dizziness  Pain  No pain reported  Exacerbated by: increased head mov't.    Social Support  Stairs in house: yes   Lives in: multiple-level home  Lives with: spouse (wife is w/c bound)    Employment status: not working  Treatments  Current treatment: physical therapy        Objective   Neuro Exam:     Oculomotor exam   Oculomotor ROM: WNL and fixated and unfixated w/ video goggles  Resting nystagmus: present and R beating; fixated and unfixated w/ video goggles  Gaze holding  "nystagmus: present left (R beating less intense than R gaze; fixated and unfixated w/ video goggles) and present right (R beating; fixated and unfixated w/ video goggles)  Smooth pursuits: within normal limits and fixated and unfixated w/ video goggles  Vertical saccades: hypermetric  Horizontal saccades: normal  Convergence: abnormal (h/o vision issues w/ L eye which is eye that deviates)  Cover test: R lateral mov't; pt reports weak L eye  Cover test: abnormal  Head thrust: right normal  Head thrust: left abnormal  Dynamic visual acuity: abnormal (ETDRS chart R; sitting; glasses on 6 line difference)    Positional testing   Positional testing comment: MCTSIB performed with shoes on, feet together, and arms crossed at chest    Oculomotor Additional Notes:  Head shake test (unfixated with video goggles):  Hor-negative   Tragal Pressure-Right-not tested Left-not tested  VOR Cx -normal    [place] Hallpike and Roll test all w/ + R beating nystagmus and min to no sx (mild dizziness, not vertigo)    Functional outcomes   Functional outcome gait comment: amb indep without ADx clinic distances and parking lot to clinic. Pt without gait deviations noted       Balance assessments   MCTSIB   Eyes open level surface: 30 sec  min sway  Eyes open foam surface: 30 sec min sway  Eyes closed level surface: 30 sec min to mod sway  Eyes closed foam surface: 4, 4, 5 sec mod to max sway             Precautions: R HL        12/27/24            Manuals #1                                                                Neuro Re-Ed             Safety; Posture, body mechanics, energy/joint conservation; vestibular pathology educ AE            Walk w/ HM -            Stand floor:  -feet together /EC -            Stand foam:  -feet together/EO -                         VOR x 1  Hor sitting x60\" (TID) Add [employer] inc to 5x/day                        Pencil push ups -                                      Ther Ex                                          "                                                                            Ther Activity                                       Gait Training                                       Modalities

## 2024-12-31 ENCOUNTER — OFFICE VISIT (OUTPATIENT)
Age: 69
End: 2024-12-31
Payer: COMMERCIAL

## 2024-12-31 DIAGNOSIS — H81.10 BENIGN PAROXYSMAL POSITIONAL VERTIGO, UNSPECIFIED LATERALITY: ICD-10-CM

## 2024-12-31 DIAGNOSIS — H83.2X2 VESTIBULAR HYPOFUNCTION OF LEFT EAR: ICD-10-CM

## 2024-12-31 DIAGNOSIS — R42 DIZZINESS: Primary | ICD-10-CM

## 2024-12-31 PROCEDURE — 97112 NEUROMUSCULAR REEDUCATION: CPT

## 2024-12-31 NOTE — PROGRESS NOTES
"Daily Note     Today's date: 2024  Patient name: Mike Villeda  : 1955  MRN: [id number]  Referring provider: Chantelle Argueta DO  Dx:   Encounter Diagnosis     ICD-10-CM    1. Dizziness  R42       2. Vestibular hypofunction of left ear  H83.2X2       3. Benign paroxysmal positional vertigo, unspecified laterality  H81.10           Start Time: 0940  Stop Time: 1015  Total time in clinic (min): 35 minutes    Subjective: pt reports that last 2 days  getting oob has been dizzy, but no other instances.  Feels off in general and very fatigue despite getting a good night sleep      Objective: See treatment diary below; supine to sit from L w/ + dizziness, from R w/ -dizziness-no nystagmus noted fixated      Assessment: Pt tolerated today's treatment session fair . Initiated VOR x 1 vert and walking w/ head moving today during session and patient education provided on HEP .  Pt challenged with walking w/ head moving. Pt completed exer with no adverse reactions  .  Discussed moving slower when getting oob.   Pt continues to benefit from skilled PT services. Will continue to encourage HEP and PT attendance while addressing pt's  functional deficits & focusing on progression of POC as patient tolerates.       Plan: Continue per plan of care.      Precautions: R HL        24           Manuals #1 #2                                                               Neuro Re-Ed             Safety; Posture, body mechanics, energy/joint conservation; vestibular pathology educ AE AE           Epley  - L x 1           Walk w/ HM (hor/vert) - 1 lap (~125') ea            Stand floor:  -feet together /EC - -           Stand foam:  -feet together/EO - -                        VOR x 1  Hor sitting x60\" (TID) Hor/vert 60\" ea sitting                         Pencil push ups - -                                     Ther Ex                                                                                               "                       Ther Activity                                       Gait Training                                       Modalities

## 2024-12-31 NOTE — PROGRESS NOTES
"Daily Note     Today's date: 2025  Patient name: Mike Villeda  : 1955  MRN: [id number]  Referring provider: Chantelle Argueta DO  Dx:   Encounter Diagnosis     ICD-10-CM    1. Vestibular hypofunction of left ear  H83.2X2       2. Benign paroxysmal positional vertigo, unspecified laterality  H81.10       3. Dizziness  R42           Start Time: 1038  Stop Time: 1123  Total time in clinic (min): 45 minutes    Subjective: pt had to get oob quickly this a.m. (to answer phone) and had sig dizziness (VAS 6/10).  Later when got oob had dizziness, but moved slower and it wasn't as severe (2-4/10) Pt reports he has been forgetting appts (PT and other places)-is getting worried about that      Objective: See treatment diary below; FGA       Assessment: Pt tolerated today's treatment session well    Educated pt and initiated VAS rating of sx today during session .  Assessed FGA today : .  Pt challenged w/ HM vert mostly (VOR x 1 and sitting trunk forward flex/return).Also challenged w/ foam/feet together HM.  No difficulty w/ standing 180 deg turns. Pt completed exer with no adverse reactions  .  Pt continues to benefit from skilled PT services. Will continue to encourage HEP and PT attendance while addressing pt's  functional deficits & focusing on progression of POC as patient tolerates.   Plan: Continue per plan of care.      Precautions: R HL        24          Manuals #1 #2 #3             VAS dizzy 2/10 w/ VOR x 1 vert                                                 Neuro Re-Ed             Safety; Posture, body mechanics, energy/joint conservation; vestibular pathology educ AE AE AE VAS          Epley  - L x 1 - -         Walk w/ HM (hor/vert) - 1 lap (~125') ea  1 lap (~125') ea           Stand floor feet together EC:  -static  -HM hor  -HM vert - -     30\"  X10  x10          Stand foam feet together/EO:  -static  -HM hor  -HM vert - -     X30\"  -x10  x10          Sit " "forward bends - - 2x5 (1,2 count)-sx w/ first set only          VOR x 1  Hor sitting x60\" (TID) Hor/vert 60\" ea sitting  Hor/vert 60\" ea sitting-sx mostly vert  Stand/busy background         180 deg turns - - 2x30\"-no sx -d/c         Pencil push ups - - X60\" (HEP)                                    Ther Ex                                                                                                                     Ther Activity                                       Gait Training                                       Modalities                                              "

## 2025-01-02 ENCOUNTER — OFFICE VISIT (OUTPATIENT)
Age: 70
End: 2025-01-02
Payer: COMMERCIAL

## 2025-01-02 DIAGNOSIS — H83.2X2 VESTIBULAR HYPOFUNCTION OF LEFT EAR: Primary | ICD-10-CM

## 2025-01-02 DIAGNOSIS — R42 DIZZINESS: ICD-10-CM

## 2025-01-02 DIAGNOSIS — H81.10 BENIGN PAROXYSMAL POSITIONAL VERTIGO, UNSPECIFIED LATERALITY: ICD-10-CM

## 2025-01-02 PROCEDURE — 97112 NEUROMUSCULAR REEDUCATION: CPT

## 2025-01-02 NOTE — PROGRESS NOTES
Daily Note     Today's date: 2025  Patient name: Mike Villeda  : 1955  MRN: [id number]  Referring provider: Chantelle Argueta DO  Dx:   Encounter Diagnosis     ICD-10-CM    1. Vestibular hypofunction of left ear  H83.2X2       2. Dizziness  R42       3. Benign paroxysmal positional vertigo, unspecified laterality  H81.10           Start Time: 1455  Stop Time: 1545  Total time in clinic (min): 50 minutes    Subjective: pt reports still lightheaded w/ looking up/down or quickly getting up from bed.  Not all the time, and, overall, to a lesser degree.  When it occurs, it is when moving head up/down but it doesn't always occur when pt does that      Objective: See treatment diary below      Assessment: Pt tolerated today's treatment session well   Assess pt tatum to busy background with alphabet find today and pt w/o inc sx.  Also initiated continuous R s/l <-->sit<-->L s/l w/ min provocation of sx. Pt still challenged w/ NPC as noted w/ pencil pushups and to cont w/ this exer.  Pt given standing on foam/pillow HM exer at home (w/ sturdy table or countertop next to pt for safety).Pt completed exer with no adverse reactions  . Min to no provocation of sx today (2/10 at worst w/ walking HM hor).   Pt continues to benefit from skilled PT services. Will continue to encourage HEP and PT attendance while addressing pt's  functional deficits & focusing on progression of POC as patient tolerates.       Plan: Continue per plan of care.      Precautions: R HL        24         Manuals #1 #2 #3 #4            VAS dizzy 2/10 w/ VOR x 1 vert VAS 1-2/10 throughout                                                Neuro Re-Ed             Safety; Posture, body mechanics, energy/joint conservation; vestibular pathology educ AE AE AE VAS AE         Epley  - L x 1 - -         Walk w/ HM (hor/vert) - 1 lap (~125') ea  1 lap (~125') ea  1 lap (~125') ea          Stand floor feet together  "EC:  -static  -HM hor  -HM vert - -     30\"  X10  x10     30\"  X10  x10         Stand foam feet together/EO:  -static  -HM hor  -HM vert - -     X30\"  -x10  x10       X30\"  x10  X10  challenging         Sit forward bends - - 2x5 (1,2 count)-sx w/ first set only x5         VOR x 1  Hor sitting x60\" (TID) Hor/vert 60\" ea sitting  Hor/vert 60\" ea sitting-sx mostly vert  Stand/busy background 60\" ea Walk f/b        Busy background alphabet find - - - X5' no sx         180 deg turns - - 2x30\"-no sx -d/c         Pencil push ups - - X60\" (HEP) 2 X60\"          R s/l <-->sit<--> L s/l - - - X5  - - - - -                 Ther Ex                                                                                                                     Ther Activity                                       Gait Training                                       Modalities                                                "

## 2025-01-07 ENCOUNTER — OFFICE VISIT (OUTPATIENT)
Age: 70
End: 2025-01-07
Payer: COMMERCIAL

## 2025-01-07 DIAGNOSIS — H83.2X2 VESTIBULAR HYPOFUNCTION OF LEFT EAR: Primary | ICD-10-CM

## 2025-01-07 DIAGNOSIS — R42 DIZZINESS: ICD-10-CM

## 2025-01-07 DIAGNOSIS — H81.10 BENIGN PAROXYSMAL POSITIONAL VERTIGO, UNSPECIFIED LATERALITY: ICD-10-CM

## 2025-01-07 PROCEDURE — 97112 NEUROMUSCULAR REEDUCATION: CPT

## 2025-01-07 NOTE — PROGRESS NOTES
Daily Note     Today's date: 2025  Patient name: Mike Villeda  : 1955  MRN: [id number]  Referring provider: Chantelle Argueta DO  Dx:   Encounter Diagnosis     ICD-10-CM    1. Vestibular hypofunction of left ear  H83.2X2       2. Dizziness  R42       3. Benign paroxysmal positional vertigo, unspecified laterality  H81.10           Start Time: 1445  Stop Time: 1525  Total time in clinic (min): 40 minutes    Subjective: pt reports he went to dentist yesterday and when he got up from the chair had inc/more intense dizziness (no vertigo and able to drive home).  Went home to bed and slept and then slept well last night (until 11 am today).        Objective: R s/l test + mild nystagmus and sx upon sitting up;  L s/l test negative .  Negative roll test or Louis hallpike fixated.  Video goggles pt w/ ? Direction changing nystagmus v. R or L beating nystagmus (hor) only.  Negative VBI testing.     Assessment: Initially ? BPPV following pt's incidence w/ dentist yesterday (and pt's h/o BPPV).  Though positional testing all negative (Roll test, Louis Hallpike and S/L Test) Pt tolerated today's treatment session fair  Pt very fatigued w/ testing.  ? Direction changing nystagmus observed w/ video goggles and/or fixated in room light.  ? Central issue? Pt instructed to contact Pcp, Dr. Argueta, for further assessment.  Phoned dr. Argueta's office and discussed pt w/ nursing staff who were to relate w/ Dr. Argueta.        Plan: await results of further assessment w/ pt's PCP     Precautions: R HL        24        Manuals #1 #2 #3 #4 #5           VAS dizzy 2/10 w/ VOR x 1 vert VAS 1-2/10 throughout                                                Neuro Re-Ed             Safety; Posture, body mechanics, energy/joint conservation; vestibular pathology educ AE AE AE VAS AE AE        Epley  - L x 1 - -         Walk w/ HM (hor/vert) - 1 lap (~125') ea  1 lap (~125') ea  1 lap (~125') ea         "  Stand floor feet together EC:  -static  -HM hor  -HM vert - -     30\"  X10  x10     30\"  X10  x10         Stand foam feet together/EO:  -static  -HM hor  -HM vert - -     X30\"  -x10  x10       X30\"  x10  X10  challenging         Sit forward bends - - 2x5 (1,2 count)-sx w/ first set only x5         VOR x 1  Hor sitting x60\" (TID) Hor/vert 60\" ea sitting  Hor/vert 60\" ea sitting-sx mostly vert  Stand/busy background 60\" ea Walk f/b        Busy background alphabet find - - - X5' no sx         180 deg turns - - 2x30\"-no sx -d/c         Pencil push ups - - X60\" (HEP) 2 X60\"          R s/l <-->sit<--> L s/l - - - X5  - - - - -                 Ther Ex                                                                                                                     Ther Activity                                       Gait Training                                       Modalities                                                  "

## 2025-01-08 ENCOUNTER — APPOINTMENT (OUTPATIENT)
Age: 70
End: 2025-01-08
Payer: COMMERCIAL

## 2025-01-09 ENCOUNTER — OFFICE VISIT (OUTPATIENT)
Age: 70
End: 2025-01-09
Payer: COMMERCIAL

## 2025-01-09 DIAGNOSIS — H81.10 BENIGN PAROXYSMAL POSITIONAL VERTIGO, UNSPECIFIED LATERALITY: ICD-10-CM

## 2025-01-09 DIAGNOSIS — R42 DIZZINESS: ICD-10-CM

## 2025-01-09 DIAGNOSIS — H83.2X2 VESTIBULAR HYPOFUNCTION OF LEFT EAR: Primary | ICD-10-CM

## 2025-01-09 PROCEDURE — 97112 NEUROMUSCULAR REEDUCATION: CPT

## 2025-01-14 ENCOUNTER — APPOINTMENT (OUTPATIENT)
Age: 70
End: 2025-01-14
Payer: COMMERCIAL

## 2025-01-16 ENCOUNTER — APPOINTMENT (OUTPATIENT)
Age: 70
End: 2025-01-16
Payer: COMMERCIAL

## 2025-01-21 ENCOUNTER — APPOINTMENT (OUTPATIENT)
Age: 70
End: 2025-01-21
Payer: COMMERCIAL

## 2025-01-23 ENCOUNTER — APPOINTMENT (OUTPATIENT)
Age: 70
End: 2025-01-23
Payer: COMMERCIAL

## 2025-06-12 ENCOUNTER — EVALUATION (OUTPATIENT)
Age: 70
End: 2025-06-12
Payer: COMMERCIAL

## 2025-06-12 DIAGNOSIS — M54.2 CERVICALGIA: Primary | ICD-10-CM

## 2025-06-12 DIAGNOSIS — M79.601 RIGHT ARM PAIN: ICD-10-CM

## 2025-06-12 PROCEDURE — 97112 NEUROMUSCULAR REEDUCATION: CPT

## 2025-06-12 PROCEDURE — 97161 PT EVAL LOW COMPLEX 20 MIN: CPT

## 2025-06-12 PROCEDURE — 97140 MANUAL THERAPY 1/> REGIONS: CPT

## 2025-06-12 NOTE — LETTER
2025    Chantelle Argueta DO  [address]  [address]  [facility]     Patient: Mike Villeda   YOB: 1955   Date of Visit: 2025     Encounter Diagnosis     ICD-10-CM    1. Cervicalgia  M54.2       2. Right arm pain  M79.601           Dear Dr. Chantelle Argueta, DO:    Thank you for your recent referral of Mike Villeda. Please review the attached evaluation summary from Mike's recent visit.     Please verify that you agree with the plan of care by signing the attached order.     If you have any questions or concerns, please do not hesitate to call.     I sincerely appreciate the opportunity to share in the care of one of your patients and hope to have another opportunity to work with you in the near future.       Sincerely,    Oksana Ch, PT      Referring Provider:      I certify that I have read the below Plan of Care and certify the need for these services furnished under this plan of treatment while under my care.                    Chantelle Argueta DO  [address]  [address]  [facility]   Via Fax: [phone]          PT Evaluation     Today's date: 2025  Patient name: Mike Villeda  : 1955  MRN: [id number]  Referring provider: Chantelle Argueta DO  Dx:   Encounter Diagnosis     ICD-10-CM    1. Cervicalgia  M54.2       2. Right arm pain  M79.601           Start Time: 1400  Stop Time: 1500  Total time in clinic (min): 60 minutes    Assessment  Impairments: abnormal or restricted ROM, activity intolerance, impaired physical strength, lacks appropriate home exercise program, pain with function, poor posture , participation limitations, activity limitations and endurance    Assessment details: Mike is a 70 y.o. male who presents to outpatient physical therapy with signs and symptoms consistent with cervical radiculopathy and R shoulder pain. Pt presents with poor posture as observed by thoracic kyphosis, excessive cervical protraction,  rounded shoulders. Posture was not modifiable. Cervical ROM had mild to moderate limitations with end range pain into lateral flexion and cervical rotation primarily on the R side. B/L shoulder ROM was WFL b/l however, quality of movement was poor into OH movement, painful with IR BTH on the R UE. Observed scapular winging with shoulder ABD at 90* and above 90*. Pt was tender to palpate along the posterolateral aspect of the R shoulder and triceps. Negative Spurling's and cervical flexion rotation test, positive cervical distraction. Shoulder special tests suggest possible impingement syndrome of the R shoulder. Neural tension testing was inconclusive on the R UE with poor patient report of symptoms, will reassess in future sessions. Despite poor pt report of symptom reactivity, pt was more symptomatic following evaluation. Performed joint mobilizations in all planes including distraction, symptoms improved. Primary impairments include ROM deficits, strength deficits, decreased tolerance to activity, and decreased muscular endurance. They present with the previously stated impairments which limit their ability to participate in recreational activities and complete tasks around the home. Provided pt with education regarding HEP, posture, sleeping position, and anatomy of condition. Mike is currently functioning below their prior level of function and is a good rehab candidate who would benefit from skilled outpatient physical therapy services to allow them to reach their goals and return to PLOF. Pt recommended for 1-2x a week for 4-6 weeks. Pt prognosis for therapy is good. PT discussed POC and goals with patient, patient was agreeable.      Physical therapist assistant (PTA) may be utilized to administer treatments as appropriate and in accordance with [facility] Physical Therapy Practice Act.      Goals  STG: To be achieved in 4 weeks from 6/12/25:   1. Mike will report pain no worse than 6/10 at worst  to indicate decreased pain level and improved participation with activity.  2. Mike will only have moderate restrictions into cervical AROM rotation to the R with minimal report of discomfort to allow patient to participate in functional tasks such as I/ADLs, driving, and functional mobility.   3. Mike will only have moderate restrictions into L/R cervical AROM lateral side bend to allow patient to participate in functional tasks such as I/ADLs, driving, and functional mobility.   4. Mike will be more mindful regarding sitting and standing posture requiring verbal cueing 50% of the time from therapist to correct posture.  5. Mike will be independent with initial home exercise program.      LTG: To be achieved in 8 weeks from 6/12/25 or upon discharge from OPPT:   1. Mike will report pain no worse than 0-4/10 at worst to indicate decreased pain level and improved participation with activity.  2. Mike  will increase quality of movement into shoulder AROM flexion and abduction to WFL to allow patient to participate in functional tasks such as I/ADLs, overhead tasks, and functional mobility.   3. Mike will have pain free AROM of the R shoulder BTH to allow patient to participate in functional tasks such as dressing, grooming.  4. Mike will have minimal restrictions into cervical  AROM rotation with minimal to no pain to allow patient to participate in functional tasks such as I/ADLs, driving, and functional mobility.   5. Mike will have minimal restrictions into cervical AROM into lateral side bending with minimal to no pain to allow patient to participate in functional tasks such as I/ADLs, driving, and functional mobility.   6. Mike will demonstrate gross 4/5 strength in bilateral upper extremities for improved stability of joint and indicate improvement in functional strength.    7. Mike will report 75% improvement in symptoms compared to start of POC to indicate functional improvement and decrease level  of disability.    8. Mike will be independent with advanced home exercise program to allow patient to transition from physical therapy care to continuing with plan of care at home without supervision from therapist and continue to progress with rehabilitation.       Plan  Patient would benefit from: PT eval and skilled physical therapy  Planned modality interventions: biofeedback, cryotherapy, thermotherapy: hydrocollator packs, ultrasound and unattended electrical stimulation    Planned therapy interventions: IASTM, joint mobilization, kinesiology taping, massage, manual therapy, Pineda taping, nerve gliding, neuromuscular re-education, patient education, strengthening, stretching, therapeutic activities, therapeutic exercise, transfer training, home exercise program, graded exercise, graded activity, flexibility, functional ROM exercises, patient/caregiver education and postural training    Frequency: 1-2x week  Duration in weeks: 6  Plan of Care beginning date: 6/12/2025  Plan of Care expiration date: 7/24/2025  Treatment plan discussed with: patient        Subjective Evaluation    History of Present Illness  Mechanism of injury: Mike is a 70 y.o. male. They present to outpatient physical therapy with the primary complaint of neck and R UE pain. Reports tingling and numbness into the fingertips. They are referred to OPPT by Chantelle Argueta DO. Symptoms began following lifting a mower out of a  truck. Mike reports he thought he was having a heart attack and went to the ER. Reports symptoms were initially managed with muscle relaxers and pain medication. Pt is still actively taking gabapentin prn. Symptoms are still present just less intense. Reports sleep disturbance 1x per night. Reports difficulty/ increase difficulty with physical labor activities including cutting fire wood, donning pants, wiping, placing his arm behind his back. 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     Patient  "Goals  Patient goals for therapy: decreased pain, increased motion, increased strength, return to sport/leisure activities and independence with ADLs/IADLs  Patient goal: \"understand his diagnosis\"  Pain  Current pain ratin  At worst pain ratin  Quality: burning, dull ache, needle-like, radiating, throbbing, cramping, pressure, sharp, tight and discomfort  Relieving factors: medications, heat, ice, rest and change in position  Progression: improved    Social Support    Employment status: not working (retired)  Hand dominance: right  Exercise history: playing the violin          Objective     Postural Observations  Seated posture: poor  Standing posture: poor  Correction of posture: unable to modify posture.    Additional Postural Observation Details  Thoracic kyphosis, excessive cervical protraction, rounded shoulders. Posture was not modifiable.     Tenderness     Additional Tenderness Details  Tender to palpate along the posterolateral aspect of the R shoulder and triceps    Neurological Testing     Sensation   Cervical/Thoracic   Left   Intact: light touch    Right   Intact: light touch    Active Range of Motion   Cervical/Thoracic Spine       Cervical    Flexion: Neck active flexion: tightness along the R UT.  Restriction level: minimal  Extension:  Restriction level: minimal  Left lateral flexion: 20 degrees     with pain Restriction level: maximal  Right lateral flexion: 13 degrees     with pain Restriction level maximal  Left rotation:  Restriction level: minimal  Right rotation:  with pain Restriction level: maximal  Left Shoulder   Normal active range of motion    Right Shoulder   Normal active range of motion    Additional Active Range of Motion Details  R UE IR BTH: Pain along the triceps    Scapular Mobility   Left Shoulder   Scapular mobility: poor  Scapular Mobility with Shoulder to 90° FF   Upward rotation: delayed and inadequate    Scapular Mobility beyond 90° FF   Upward rotation: inadequate " and delayed    Right Shoulder   Scapular mobility: poor  Scapular Mobility with Shoulder to 90° FF   Upward rotation: delayed and inadequate    Scapular Mobility beyond 90° FF   Upward rotation: inadequate and delayed    Additional Scapular Mobility Details  Scapular winging b/l L>R shoulder ABD below 90 and above 90    Joint Play   Left Shoulder  Hypomobile in the anterior capsule, posterior capsule, inferior capsule and thoracic spine.    Right Shoulder  Hypomobile in the anterior capsule, posterior capsule, inferior capsule and thoracic spine.     Strength/Myotome Testing     Left Shoulder     Planes of Motion   Flexion: 4+   Abduction: 4+   External rotation at 0°: 4   Internal rotation at 0°: 4     Isolated Muscles   Biceps: 4+   Lower trapezius: 3   Middle trapezius: 3   Rhomboids: 3   Upper trapezius: 5     Right Shoulder     Planes of Motion   Flexion: 4-   Abduction: 4-   External rotation at 0°: 4   Internal rotation at 0°: 4-     Isolated Muscles   Biceps: 4   Lower trapezius: 3-   Middle trapezius: 3-   Rhomboids: 3-   Upper trapezius: 4+     Tests   Cervical     Left   Negative Spurling's Test A.     Right   Negative Spurling's Test A.     Left Shoulder   Negative drop arm, empty can, external rotation lag sign, full can, Hawkin's, lift-off, Neer's, ULTT1, ULTT2, ULTT3 and ULTT4.     Right Shoulder   Positive Hawkin's, Neer's and painful arc.   Negative drop arm, empty can, external rotation lag sign, full can, internal rotation lag sign and lift-off.     Additional Tests Details  Neural tension testing is inconclusive based on pt report. Pt was unable to identify symptoms changes despite significant verbal cueing/ prompting from therapist. Will reassess at a later date.             Precautions: standard precautions      Date: 6/12            Visit #: 1 2 3 4 5 6 7 8 9 10   Manuals             Shoulder mob (dist, post, inf) RG            Cervical distraction             Sub-occipital release              IASTM/ STM             Cupping             Neural gliding                          Neuro Re-Ed             EDU/HEP EDU on HEP, posture, sleeping position, and anatomy of condition            UT stretch              Lev scap stretch              SCM stretch              Cervical retraction             Scap retractions             Scap protractions             PB neural glide                                       Ther Ex             UBE                                                                                                        Ther Activity                                       Gait Training                                       Modalities

## 2025-06-12 NOTE — PROGRESS NOTES
PT Evaluation     Today's date: 2025  Patient name: Mike Villeda  : 1955  MRN: [id number]  Referring provider: Chantelle Argueta DO  Dx:   Encounter Diagnosis     ICD-10-CM    1. Cervicalgia  M54.2       2. Right arm pain  M79.601           Start Time: 1400  Stop Time: 1500  Total time in clinic (min): 60 minutes    Assessment  Impairments: abnormal or restricted ROM, activity intolerance, impaired physical strength, lacks appropriate home exercise program, pain with function, poor posture , participation limitations, activity limitations and endurance    Assessment details: Mike is a 70 y.o. male who presents to outpatient physical therapy with signs and symptoms consistent with cervical radiculopathy and R shoulder pain. Pt presents with poor posture as observed by thoracic kyphosis, excessive cervical protraction, rounded shoulders. Posture was not modifiable. Cervical ROM had mild to moderate limitations with end range pain into lateral flexion and cervical rotation primarily on the R side. B/L shoulder ROM was WFL b/l however, quality of movement was poor into OH movement, painful with IR BTH on the R UE. Observed scapular winging with shoulder ABD at 90* and above 90*. Pt was tender to palpate along the posterolateral aspect of the R shoulder and triceps. Negative Spurling's and cervical flexion rotation test, positive cervical distraction. Shoulder special tests suggest possible impingement syndrome of the R shoulder. Neural tension testing was inconclusive on the R UE with poor patient report of symptoms, will reassess in future sessions. Despite poor pt report of symptom reactivity, pt was more symptomatic following evaluation. Performed joint mobilizations in all planes including distraction, symptoms improved. Primary impairments include ROM deficits, strength deficits, decreased tolerance to activity, and decreased muscular endurance. They present with the previously stated impairments  which limit their ability to participate in recreational activities and complete tasks around the home. Provided pt with education regarding HEP, posture, sleeping position, and anatomy of condition. Mike is currently functioning below their prior level of function and is a good rehab candidate who would benefit from skilled outpatient physical therapy services to allow them to reach their goals and return to PLOF. Pt recommended for 1-2x a week for 4-6 weeks. Pt prognosis for therapy is good. PT discussed POC and goals with patient, patient was agreeable.      Physical therapist assistant (PTA) may be utilized to administer treatments as appropriate and in accordance with [facility] Physical Therapy Practice Act.      Goals  STG: To be achieved in 4 weeks from 6/12/25:   1. Mike will report pain no worse than 6/10 at worst to indicate decreased pain level and improved participation with activity.  2. Mike will only have moderate restrictions into cervical AROM rotation to the R with minimal report of discomfort to allow patient to participate in functional tasks such as I/ADLs, driving, and functional mobility.   3. Mike will only have moderate restrictions into L/R cervical AROM lateral side bend to allow patient to participate in functional tasks such as I/ADLs, driving, and functional mobility.   4. Mike will be more mindful regarding sitting and standing posture requiring verbal cueing 50% of the time from therapist to correct posture.  5. Mike will be independent with initial home exercise program.      LTG: To be achieved in 8 weeks from 6/12/25 or upon discharge from OPPT:   1. Mike will report pain no worse than 0-4/10 at worst to indicate decreased pain level and improved participation with activity.  2. Mike  will increase quality of movement into shoulder AROM flexion and abduction to WFL to allow patient to participate in functional tasks such as I/ADLs, overhead tasks, and functional  mobility.   3. Mike will have pain free AROM of the R shoulder BTH to allow patient to participate in functional tasks such as dressing, grooming.  4. Mike will have minimal restrictions into cervical  AROM rotation with minimal to no pain to allow patient to participate in functional tasks such as I/ADLs, driving, and functional mobility.   5. Mike will have minimal restrictions into cervical AROM into lateral side bending with minimal to no pain to allow patient to participate in functional tasks such as I/ADLs, driving, and functional mobility.   6. Mike will demonstrate gross 4/5 strength in bilateral upper extremities for improved stability of joint and indicate improvement in functional strength.    7. Mike will report 75% improvement in symptoms compared to start of POC to indicate functional improvement and decrease level of disability.    8. Mike will be independent with advanced home exercise program to allow patient to transition from physical therapy care to continuing with plan of care at home without supervision from therapist and continue to progress with rehabilitation.       Plan  Patient would benefit from: PT eval and skilled physical therapy  Planned modality interventions: biofeedback, cryotherapy, thermotherapy: hydrocollator packs, ultrasound and unattended electrical stimulation    Planned therapy interventions: IASTM, joint mobilization, kinesiology taping, massage, manual therapy, Pineda taping, nerve gliding, neuromuscular re-education, patient education, strengthening, stretching, therapeutic activities, therapeutic exercise, transfer training, home exercise program, graded exercise, graded activity, flexibility, functional ROM exercises, patient/caregiver education and postural training    Frequency: 1-2x week  Duration in weeks: 6  Plan of Care beginning date: 6/12/2025  Plan of Care expiration date: 7/24/2025  Treatment plan discussed with: patient        Subjective  "Evaluation    History of Present Illness  Mechanism of injury: Mike is a 70 y.o. male. They present to outpatient physical therapy with the primary complaint of neck and R UE pain. Reports tingling and numbness into the fingertips. They are referred to OPPT by Chantelle Argueta DO. Symptoms began following lifting a mower out of a  truck. Mike reports he thought he was having a heart attack and went to the ER. Reports symptoms were initially managed with muscle relaxers and pain medication. Pt is still actively taking gabapentin prn. Symptoms are still present just less intense. Reports sleep disturbance 1x per night. Reports difficulty/ increase difficulty with physical labor activities including cutting fire wood, donning pants, wiping, placing his arm behind his back. 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     Patient Goals  Patient goals for therapy: decreased pain, increased motion, increased strength, return to sport/leisure activities and independence with ADLs/IADLs  Patient goal: \"understand his diagnosis\"  Pain  Current pain ratin  At worst pain ratin  Quality: burning, dull ache, needle-like, radiating, throbbing, cramping, pressure, sharp, tight and discomfort  Relieving factors: medications, heat, ice, rest and change in position  Progression: improved    Social Support    Employment status: not working (retired)  Hand dominance: right  Exercise history: playing the violin          Objective     Postural Observations  Seated posture: poor  Standing posture: poor  Correction of posture: unable to modify posture.    Additional Postural Observation Details  Thoracic kyphosis, excessive cervical protraction, rounded shoulders. Posture was not modifiable.     Tenderness     Additional Tenderness Details  Tender to palpate along the posterolateral aspect of the R shoulder and triceps    Neurological Testing     Sensation   Cervical/Thoracic   Left   Intact: light touch    Right   Intact: " light touch    Active Range of Motion   Cervical/Thoracic Spine       Cervical    Flexion: Neck active flexion: tightness along the R UT.  Restriction level: minimal  Extension:  Restriction level: minimal  Left lateral flexion: 20 degrees     with pain Restriction level: maximal  Right lateral flexion: 13 degrees     with pain Restriction level maximal  Left rotation:  Restriction level: minimal  Right rotation:  with pain Restriction level: maximal  Left Shoulder   Normal active range of motion    Right Shoulder   Normal active range of motion    Additional Active Range of Motion Details  R UE IR BTH: Pain along the triceps    Scapular Mobility   Left Shoulder   Scapular mobility: poor  Scapular Mobility with Shoulder to 90° FF   Upward rotation: delayed and inadequate    Scapular Mobility beyond 90° FF   Upward rotation: inadequate and delayed    Right Shoulder   Scapular mobility: poor  Scapular Mobility with Shoulder to 90° FF   Upward rotation: delayed and inadequate    Scapular Mobility beyond 90° FF   Upward rotation: inadequate and delayed    Additional Scapular Mobility Details  Scapular winging b/l L>R shoulder ABD below 90 and above 90    Joint Play   Left Shoulder  Hypomobile in the anterior capsule, posterior capsule, inferior capsule and thoracic spine.    Right Shoulder  Hypomobile in the anterior capsule, posterior capsule, inferior capsule and thoracic spine.     Strength/Myotome Testing     Left Shoulder     Planes of Motion   Flexion: 4+   Abduction: 4+   External rotation at 0°: 4   Internal rotation at 0°: 4     Isolated Muscles   Biceps: 4+   Lower trapezius: 3   Middle trapezius: 3   Rhomboids: 3   Upper trapezius: 5     Right Shoulder     Planes of Motion   Flexion: 4-   Abduction: 4-   External rotation at 0°: 4   Internal rotation at 0°: 4-     Isolated Muscles   Biceps: 4   Lower trapezius: 3-   Middle trapezius: 3-   Rhomboids: 3-   Upper trapezius: 4+     Tests   Cervical     Left    Negative Spurling's Test A.     Right   Negative Spurling's Test A.     Left Shoulder   Negative drop arm, empty can, external rotation lag sign, full can, Hawkin's, lift-off, Neer's, ULTT1, ULTT2, ULTT3 and ULTT4.     Right Shoulder   Positive Hawkin's, Neer's and painful arc.   Negative drop arm, empty can, external rotation lag sign, full can, internal rotation lag sign and lift-off.     Additional Tests Details  Neural tension testing is inconclusive based on pt report. Pt was unable to identify symptoms changes despite significant verbal cueing/ prompting from therapist. Will reassess at a later date.             Precautions: standard precautions      Date: 6/12            Visit #: 1 2 3 4 5 6 7 8 9 10   Manuals             Shoulder mob (dist, post, inf) RG            Cervical distraction             Sub-occipital release             IASTM/ STM             Cupping             Neural gliding                          Neuro Re-Ed             EDU/HEP EDU on HEP, posture, sleeping position, and anatomy of condition            UT stretch              Lev scap stretch              SCM stretch              Cervical retraction             Scap retractions             Scap protractions             PB neural glide                                       Ther Ex             UBE                                                                                                        Ther Activity                                       Gait Training                                       Modalities

## 2025-06-16 ENCOUNTER — OFFICE VISIT (OUTPATIENT)
Age: 70
End: 2025-06-16
Payer: COMMERCIAL

## 2025-06-16 DIAGNOSIS — M79.601 RIGHT ARM PAIN: ICD-10-CM

## 2025-06-16 DIAGNOSIS — M54.2 CERVICALGIA: Primary | ICD-10-CM

## 2025-06-16 PROCEDURE — 97112 NEUROMUSCULAR REEDUCATION: CPT

## 2025-06-16 PROCEDURE — 97110 THERAPEUTIC EXERCISES: CPT

## 2025-06-16 NOTE — PROGRESS NOTES
"Daily Note     Today's date: 2025  Patient name: Mike Villeda  : 1955  MRN: [id number]  Referring provider: Chantelle Argueta DO  Dx:   Encounter Diagnosis     ICD-10-CM    1. Cervicalgia  M54.2       2. Right arm pain  M79.601           Start Time: 0915  Stop Time: 1005  Total time in clinic (min): 50 minutes    Subjective: Pt reports following IE he had increased symptoms into that evening. Pt notes he got an appointment with spine and pain on 25.       Objective: See treatment diary below      Assessment: Pt performed UE bike aerobic exercises to increase blood flow to the area being treated, prepare the muscles for strength training and stretching, improve overall tolerance to activity, and aerobic endurance. Pt noted relief in symptoms briefly with cervical distraction and shoulder distraction. R shoulder PROM improved about 5-10 degrees pain free following inferior and posterior mobs. Required verbal and tactile cueing for posture throughout session. Benefited from seated stretches and neural mobilizations at the end of the session. Provided pt with updated HEP. Pt left session reported decreased stiffness. Pt continues to benefit from physical therapy in order to continue progressing towards goals as outlined and return to PLOF. Will continue to modify and advance current POC based on overall progress and symptom irritability.       Plan: Continue per plan of care.  Progress treatment as tolerated.       Precautions: standard precautions      Date:            Visit #: 1 2 3 4 5 6 7 8 9 10   Manuals             R Shoulder mob (dist, post, inf) RG RG grade 3           Cervical distraction  RG           Sub-occipital release  RG           IASTM/ STM             Cupping             Neural gliding                          Neuro Re-Ed             EDU/HEP EDU on HEP, posture, sleeping position, and anatomy of condition RG           UT stretch   3x30\" holds ea b/l           Lev scap " "stretch   3x30\" holds ea b/l           SCM stretch   nv           Cervical retraction  Supine 15x5\" holds ea            Scap retractions  15x5\" holds ea           Scap protractions  X20 ea           PB neural glide  Green PB flex and ABD x20 ea                                      Ther Ex             UBE  8' 1.5 FWD only                                                                                                      Ther Activity                                       Gait Training                                       Modalities                                            "

## 2025-06-23 ENCOUNTER — OFFICE VISIT (OUTPATIENT)
Age: 70
End: 2025-06-23
Payer: COMMERCIAL

## 2025-06-23 DIAGNOSIS — M54.2 CERVICALGIA: Primary | ICD-10-CM

## 2025-06-23 DIAGNOSIS — M79.601 RIGHT ARM PAIN: ICD-10-CM

## 2025-06-23 PROCEDURE — 97110 THERAPEUTIC EXERCISES: CPT

## 2025-06-23 PROCEDURE — 97112 NEUROMUSCULAR REEDUCATION: CPT

## 2025-06-23 PROCEDURE — 97140 MANUAL THERAPY 1/> REGIONS: CPT

## 2025-06-23 NOTE — PROGRESS NOTES
Daily Note     Today's date: 2025  Patient name: Mike Villeda  : 1955  MRN: [id number]  Referring provider: Chantelle Argueta DO  Dx:   Encounter Diagnosis     ICD-10-CM    1. Cervicalgia  M54.2       2. Right arm pain  M79.601             Start Time: 1208  Stop Time: 1300  Total time in clinic (min): 52 minutes    Subjective: Reports feeling good following last session for a few hours and then required use of Gabapentin again. Notes he was putting in 2 fence posts today and played his fiddle for several hours of the weekend, all of which has continued to aggravate his symptoms. Notes 3-4/10 symptoms in the neck, R shoulder/ elbow and continued tingling and numbness into digits 2-5      Objective: See treatment diary below      Assessment: Pt performed UE bike aerobic exercises to increase blood flow to the area being treated, prepare the muscles for strength training and stretching, improve overall tolerance to activity, and aerobic endurance. Utilized cervical traction machine for prolonged and sustained traction of the cervical spine (15-20#). Following traction, pt had no symptoms in the in the neck, R shoulder, and elbow, did continue to have tingling and numbness into the digits 2-5. Followed manual work with self stretches and neural gliding. Throughout session pt required verbal cueing from therapist for corrected posture. Pt continues to benefit from physical therapy in order to continue progressing towards goals as outlined and return to PLOF. Will continue to modify and advance current POC based on overall progress and symptom irritability.       Plan: Continue per plan of care.  Progress treatment as tolerated.       Precautions: standard precautions      Date:           Visit #: 1 2 3 4 5 6 7 8 9 10   Manuals             R Shoulder mob (dist, post, inf) RG RG grade 3 RG grade 3          Cervical distraction  RG           Sub-occipital release  RG           IASTM/ STM       "       Cupping             Neural gliding             Cervical traction   Traction machine   10' 15#-20#                                    Neuro Re-Ed             EDU/HEP EDU on HEP, posture, sleeping position, and anatomy of condition RG RG          UT stretch   3x30\" holds ea b/l 3x30\" holds ea b/l          Lev scap stretch   3x30\" holds ea b/l 3x30\" holds ea b/l          SCM stretch   nv           Cervical retraction  Supine 15x5\" holds ea            Scap retractions  15x5\" holds ea 15x5\" holds ea           Scap protractions  X20 ea X20 ea          PB neural glide  Green PB flex and ABD x20 ea  Green PB flex and ABD x20 ea                                    Ther Ex             UBE  8' 1.5 FWD only 1'/1' total 10' L 1.5                                                                                                     Ther Activity                                       Gait Training                                       Modalities                                              "

## 2025-06-26 ENCOUNTER — OFFICE VISIT (OUTPATIENT)
Age: 70
End: 2025-06-26
Payer: COMMERCIAL

## 2025-06-26 DIAGNOSIS — M79.601 RIGHT ARM PAIN: ICD-10-CM

## 2025-06-26 DIAGNOSIS — M54.2 CERVICALGIA: Primary | ICD-10-CM

## 2025-06-26 PROCEDURE — 97140 MANUAL THERAPY 1/> REGIONS: CPT

## 2025-06-26 PROCEDURE — 97112 NEUROMUSCULAR REEDUCATION: CPT

## 2025-06-26 PROCEDURE — 97110 THERAPEUTIC EXERCISES: CPT

## 2025-06-26 NOTE — PROGRESS NOTES
"Daily Note     Today's date: 2025  Patient name: Mike Villeda  : 1955  MRN: [id number]  Referring provider: Chantelle Argueta DO  Dx:   Encounter Diagnosis     ICD-10-CM    1. Cervicalgia  M54.2       2. Right arm pain  M79.601           Start Time: 0945  Stop Time: 1030  Total time in clinic (min): 45 minutes    Subjective: Pt report 3-4/10 symptoms in the R shoulder and elbow. Notes following last session he had reduction in radicular symptoms but did note increased cervical stiffness for the remainder of the day.      Objective: See treatment diary below      Assessment: Pt performed UE bike aerobic exercises to increase blood flow to the area being treated, prepare the muscles for strength training and stretching, improve overall tolerance to activity, and aerobic endurance. Continued to utilize cervical traction machine to increase space throughout the cervical spine, continues to note immediate relief. Performed STM over the R pec, decreased symptoms of \"burning in the R bicep. Introduced manual neural gliding, self bicep stretch, and door way pec stretch. Pt left session without symptoms at rest. Pt continues to benefit from physical therapy in order to continue progressing towards goals as outlined and return to PLOF. Will continue to modify and advance current POC based on overall progress and symptom irritability.       Plan: Continue per plan of care.  Progress treatment as tolerated.       Precautions: standard precautions      Date:          Visit #: 1 2 3 4 5 6 7 8 9 10   Manuals             R Shoulder mob (dist, post, inf) RG RG grade 3 RG grade 3 RG grade 3         Cervical distraction  RG           Sub-occipital release  RG           IASTM/ STM    R PEC         Cupping             Neural gliding    Median and radial nerve bias x15         Cervical traction   Traction machine   10' 15#-20# Traction machine   10' 15#-20#                                   Neuro " "Re-Ed             EDU/HEP EDU on HEP, posture, sleeping position, and anatomy of condition RG RG RG         Door way pec stretch     3x30\" holds ea R only          Bicep stretch standing at plinth    3x30\" holds ea R only          UT stretch   3x30\" holds ea b/l 3x30\" holds ea b/l          Lev scap stretch   3x30\" holds ea b/l 3x30\" holds ea b/l          SCM stretch   nv           Cervical retraction  Supine 15x5\" holds ea            Scap retractions  15x5\" holds ea 15x5\" holds ea           Scap protractions  X20 ea X20 ea X20 ea         PB neural glide  Green PB flex and ABD x20 ea  Green PB flex and ABD x20 ea                                    Ther Ex             UBE  8' 1.5 FWD only 1'/1' total 10' L 1.5 1'/1' total 10' L 1.5                                                                                                    Ther Activity                                       Gait Training                                       Modalities                                                "

## 2025-06-26 NOTE — HOME EXERCISE EDUCATION
Program_ID:[id number]   Access Code: [email or web address]  URL: [email or web address]  Date: 06-  Prepared By: Oksana Ch    Program Notes      Exercises      - Supine Cervical Retraction with Towel - 2-3 x daily - 7 x weekly - 1 sets - 10 reps - 3-5 sec hold      - Supine Scapular Protraction in Flexion with Dumbbells - 2-3 x daily - 7 x weekly - 1 sets - 10 reps      - Seated Scapular Retraction - 2-3 x daily - 7 x weekly - 1 sets - 10 reps - 5 sec hold      - Seated Upper Trapezius Stretch - 2-3 x daily - 7 x weekly - 1 sets - 3 reps - 30 sec hold      - Gentle Levator Scapulae Stretch - 2-3 x daily - 7 x weekly - 1 sets - 3 reps - 30 sec hold      - Bicep Stretch at Table - 2-3 x daily - 7 x weekly - 1 sets - 3 reps - 30 sec hold      - Single Arm Doorway Pec Stretch at 90 Degrees Abduction - 2-3 x daily - 7 x weekly - 1 sets - 3 reps - 30 sec hold

## 2025-06-30 NOTE — PROGRESS NOTES
"Daily Note     Today's date: 2025  Patient name: Mike Villeda  : 1955  MRN: [id number]  Referring provider: Chantelle Argueta DO  Dx:   Encounter Diagnosis     ICD-10-CM    1. Cervicalgia  M54.2       2. Right arm pain  M79.601           Start Time: 1100  Stop Time: 1150  Total time in clinic (min): 50 minutes    Subjective: Pt had MRI with results of C6-7 HNP, and DDD, multi level spondylosis,  Pain range 0-5, pain radiates to R elbow      Objective: See treatment diary below      Assessment: . Patient performed UBE aerobic exercise to increase blood flow to the area being treated, prepare the muscles for strength training and stretching, improve overall tolerance to activity, and aerobic endurance. Treatment of cervical flexibility, stabilization and shoulder flexibility exercise continues. Pt reports radiating symptoms to elbow frequently with exercise.  Symptoms centralize with traction.  Plan to continue PT with goal of increasing activity level with less pain.  Plan: Progress treatment as tolerated.       Precautions: standard precautions      Date:         Visit #: 1 2 3 4 5 6 7 8 9 10   Manuals             R Shoulder mob (dist, post, inf) RG RG grade 3 RG grade 3 RG grade 3 CRR        Cervical distraction  RG           Sub-occipital release  RG           IASTM/ STM    R PEC         Cupping             Neural gliding    Median and radial nerve bias x15 Median n glide elbow/wrist 10x    Neck wrist 10x      Radial n glide elbow wrist 10x        Cervical traction   Traction machine   10' 15#-20# Traction machine   10' 15#-20# Traction machine  12'  18#        Shay neural mobs     CRR with median n bias                     Neuro Re-Ed             EDU/HEP EDU on HEP, posture, sleeping position, and anatomy of condition RG RG RG CRR    Reviewed importance of head position, posture, body mechanics        Door way pec stretch     3x30\" holds ea R only          Bicep stretch " "standing at plinth    3x30\" holds ea R only          UT stretch   3x30\" holds ea b/l 3x30\" holds ea b/l          Lev scap stretch   3x30\" holds ea b/l 3x30\" holds ea b/l          SCM stretch   nv           Cervical retraction  Supine 15x5\" holds ea    Seated 10x hold 5\"        Scap retractions  15x5\" holds ea 15x5\" holds ea   10x hold 5\"        Scap protractions  X20 ea X20 ea X20 ea Supine B     2 x 10        PB neural glide  Green PB flex and ABD x20 ea  Green PB flex and ABD x20 ea          Cervical lat flx L/R  Rot L/R  flx     5x all hold 5\"                     Ther Ex             UBE  8' 1.5 FWD only 1'/1' total 10' L 1.5 1'/1' total 10' L 1.5 1'/1' total 4'  Then increase in R elbow symptoms                                                                                                   Ther Activity                                       Gait Training                                       Modalities                                                  "

## 2025-07-02 ENCOUNTER — OFFICE VISIT (OUTPATIENT)
Age: 70
End: 2025-07-02
Payer: COMMERCIAL

## 2025-07-02 DIAGNOSIS — M54.2 CERVICALGIA: Primary | ICD-10-CM

## 2025-07-02 DIAGNOSIS — M79.601 RIGHT ARM PAIN: ICD-10-CM

## 2025-07-02 PROCEDURE — 97110 THERAPEUTIC EXERCISES: CPT | Performed by: PHYSICAL THERAPIST

## 2025-07-02 PROCEDURE — 97112 NEUROMUSCULAR REEDUCATION: CPT | Performed by: PHYSICAL THERAPIST

## 2025-07-02 PROCEDURE — 97012 MECHANICAL TRACTION THERAPY: CPT | Performed by: PHYSICAL THERAPIST

## 2025-07-07 ENCOUNTER — APPOINTMENT (OUTPATIENT)
Age: 70
End: 2025-07-07
Payer: COMMERCIAL

## 2025-07-14 ENCOUNTER — OFFICE VISIT (OUTPATIENT)
Age: 70
End: 2025-07-14
Payer: COMMERCIAL

## 2025-07-14 DIAGNOSIS — M54.2 CERVICALGIA: Primary | ICD-10-CM

## 2025-07-14 DIAGNOSIS — M79.601 RIGHT ARM PAIN: ICD-10-CM

## 2025-07-14 PROCEDURE — 97012 MECHANICAL TRACTION THERAPY: CPT | Performed by: PHYSICAL THERAPIST

## 2025-07-14 PROCEDURE — 97112 NEUROMUSCULAR REEDUCATION: CPT | Performed by: PHYSICAL THERAPIST

## 2025-07-14 NOTE — PROGRESS NOTES
Daily Note     Today's date: 2025  Patient name: Mike Villeda  : 1955  MRN: [id number]  Referring provider: Chantelle Argueta DO  Dx:   Encounter Diagnosis     ICD-10-CM    1. Cervicalgia  M54.2       2. Right arm pain  M79.601           Start Time: 1515  Stop Time: 1555  Total time in clinic (min): 40 minutes    Subjective: Pt rpeorts frequent paresthesias B hands in IF, MF, RF.  Pain range 0-4/10.    Objective: See treatment diary below      Assessment: Pt performed UE bike aerobic exercises to increase blood flow to the area being treated, prepare the muscles for strength training and stretching, improve overall tolerance to activity, and aerobic endurance. Pt able to tolerated stationary bike for 6 minutes without increase in hand paresthesias or neck pain.  Pain relief and decreased paresthesias noted with mechanical traction.  A home traction unit for ongoing I HEP had been pordered.Pt continues to benefit from physical therapy in order to continue progressing towards goals as outlined and return to PLOF. Will continue to modify and advance current POC based on overall progress and symptom irritability.       Plan: Continue per plan of care.  Progress treatment as tolerated.       Precautions: standard precautions      Date:        Visit #: 1 2 3 4 5 6 7 8 9 10   Manuals             R Shoulder mob (dist, post, inf) RG RG grade 3 RG grade 3 RG grade 3 CRR CRR       Cervical distraction  RG           Sub-occipital release  RG           IASTM/ STM    R PEC         Cupping             Neural gliding    Median and radial nerve bias x15 Median n glide elbow/wrist 10x    Neck wrist 10x      Radial n glide elbow wrist 10x 10x L/R      10x  L/R       Cervical traction   Traction machine   10' 15#-20# Traction machine   10' 15#-20# Traction machine  12'  18# 12'  20#       Shay neural mobs     CRR with median n bias                     Neuro Re-Ed             EDU/HEP EDU  "on HEP, posture, sleeping position, and anatomy of condition RG RG RG CRR    Reviewed importance of head position, posture, body mechanics Reviewed posture and body mechanics       Door way pec stretch     3x30\" holds ea R only          Bicep stretch standing at plinth    3x30\" holds ea R only          UT stretch   3x30\" holds ea b/l 3x30\" holds ea b/l          Lev scap stretch   3x30\" holds ea b/l 3x30\" holds ea b/l          SCM stretch   nv           Cervical retraction  Supine 15x5\" holds ea    Seated 10x hold 5\" 10x hold 5\"       Scap retractions  15x5\" holds ea 15x5\" holds ea   10x hold 5\" 10x hold 5\"       Scap protractions  X20 ea X20 ea X20 ea Supine B     2 x 10 2 x 10       PB neural glide  Green PB flex and ABD x20 ea  Green PB flex and ABD x20 ea          Cervical lat flx L/R  Rot L/R  flx     5x all hold 5\" 5x all hold 5\"                    Ther Ex             UBE  8' 1.5 FWD only 1'/1' total 10' L 1.5 1'/1' total 10' L 1.5 1'/1' total 4'  Then increase in R elbow symptoms 3'/3'  F/B L1                                                                                                  Ther Activity                                       Gait Training                                       Modalities                                                      "

## 2025-07-16 ENCOUNTER — OFFICE VISIT (OUTPATIENT)
Age: 70
End: 2025-07-16
Payer: COMMERCIAL

## 2025-07-16 DIAGNOSIS — M54.2 CERVICALGIA: Primary | ICD-10-CM

## 2025-07-16 DIAGNOSIS — M79.601 RIGHT ARM PAIN: ICD-10-CM

## 2025-07-16 PROCEDURE — 97110 THERAPEUTIC EXERCISES: CPT | Performed by: PHYSICAL THERAPIST

## 2025-07-16 PROCEDURE — 97112 NEUROMUSCULAR REEDUCATION: CPT | Performed by: PHYSICAL THERAPIST

## 2025-07-16 PROCEDURE — 97012 MECHANICAL TRACTION THERAPY: CPT | Performed by: PHYSICAL THERAPIST

## 2025-07-16 NOTE — PROGRESS NOTES
Daily Note     Today's date: 2025  Patient name: Mike Villeda  : 1955  MRN: [id number]  Referring provider: Chantelle Argueta DO  Dx:   Encounter Diagnosis     ICD-10-CM    1. Cervicalgia  M54.2       2. Right arm pain  M79.601           Start Time: 1530  Stop Time: 1620  Total time in clinic (min): 50 minutes    Subjective: Pain range 0-3/10.  Constant paresthesias B hnads      Objective: See treatment diary below      Assessment: . Patient performed UBE aerobic exercise to increase blood flow to the area being treated, prepare the muscles for strength training and stretching, improve overall tolerance to activity, and aerobic endurance. Treatment of cervical stabilization, neck flexibility, postural strengthening and mechanical cervical traction continues.  Pt waiting for home cervical traction unit.  Pt tolerated all exercise well, however continues to beverly VC to maintain head position with stabilization ex.  Plan to continue PT with goal of increasing activity level with less pain, and to centralize B UE symptoms.    Plan: Progress treatment as tolerated.       Precautions: standard precautions      Date:  7      Visit #: 1 2 3 4 5 6 7 8 9 10   Manuals             R Shoulder mob (dist, post, inf) RG RG grade 3 RG grade 3 RG grade 3 CRR CRR       Cervical distraction  RG           Sub-occipital release  RG           IASTM/ STM    R PEC         Cupping             Neural gliding    Median and radial nerve bias x15 Median n glide elbow/wrist 10x    Neck wrist 10x      Radial n glide elbow wrist 10x 10x L/R      10x  L/R 2 x 10 L/R      Radal 2 x 10 L/R      Cervical traction   Traction machine   10' 15#-20# Traction machine   10' 15#-20# Traction machine  12  18# 12'  20# 15'    22#      Shay neural mobs     CRR with median n bias                     Neuro Re-Ed             EDU/HEP EDU on HEP, posture, sleeping position, and anatomy of condition RG RG RG  "CRR    Reviewed importance of head position, posture, body mechanics Reviewed posture and body mechanics Written HEP cervical stabilization with tbands, wall angels, nd nerve glides      Door way pec stretch     3x30\" holds ea R only          Bicep stretch standing at plinth    3x30\" holds ea R only          UT stretch   3x30\" holds ea b/l 3x30\" holds ea b/l          Lev scap stretch   3x30\" holds ea b/l 3x30\" holds ea b/l          SCM stretch   nv           Cervical retraction  Supine 15x5\" holds ea    Seated 10x hold 5\" 10x hold 5\" 10x hold 5\"      Scap retractions  15x5\" holds ea 15x5\" holds ea   10x hold 5\" 10x hold 5\" 10x hold 5\"      Scap elevate/depress        10x hold 5\" on depress      Scap protractions  X20 ea X20 ea X20 ea Supine B     2 x 10 2 x 10 2 x 10      PB neural glide  Green PB flex and ABD x20 ea  Green PB flex and ABD x20 ea          Cervical lat flx L/R  Rot L/R  flx     5x all hold 5\" 5x all hold 5\" 5x all hold 5\"                   Ther Ex             UBE  8' 1.5 FWD only 1'/1' total 10' L 1.5 1'/1' total 10' L 1.5 1'/1' total 4'  Then increase in R elbow symptoms 3'/3'  F/B L1 3'/3'      B shoulder/elbow TB extension       10x /10x green TB hold 5\"      Wall angels       10x                                                                       Ther Activity                                       Gait Training                                       Modalities                                                        "

## 2025-07-21 ENCOUNTER — OFFICE VISIT (OUTPATIENT)
Age: 70
End: 2025-07-21
Payer: COMMERCIAL

## 2025-07-21 DIAGNOSIS — M54.2 CERVICALGIA: Primary | ICD-10-CM

## 2025-07-21 DIAGNOSIS — M79.601 RIGHT ARM PAIN: ICD-10-CM

## 2025-07-21 PROCEDURE — 97012 MECHANICAL TRACTION THERAPY: CPT | Performed by: PHYSICAL THERAPIST

## 2025-07-21 PROCEDURE — 97112 NEUROMUSCULAR REEDUCATION: CPT | Performed by: PHYSICAL THERAPIST

## 2025-07-21 PROCEDURE — 97110 THERAPEUTIC EXERCISES: CPT | Performed by: PHYSICAL THERAPIST

## 2025-07-21 NOTE — PROGRESS NOTES
Daily Note     Today's date: 2025  Patient name: Mike Villeda  : 1955  MRN: [id number]  Referring provider: Chantelle Argueta DO  Dx:   Encounter Diagnosis     ICD-10-CM    1. Cervicalgia  M54.2       2. Right arm pain  M79.601           Start Time: 0940  Stop Time: 1030  Total time in clinic (min): 50 minutes    Subjective: Pt's wife  6 days ago, and he states he has not had a lot of sleep and has understandable been very stressed.  Pain today is 3/10      Objective: See treatment diary below      Assessment: Pt states he has not done his HEP over past week due to his wife passing away.  Paresthesias B Ue's continue.  Symptom relief with mechanical cervical traction. Pt needs VC for cervical stabilization exercises.  Plan to continue PT with goal of decreasing pain, centralizing neurological symptomes and increasing activity level.      Plan: Progress treatment as tolerated.       Precautions: standard precautions      Date:      Visit #: 1 2 3 4 5 6 7 8 9 10   Manuals             R Shoulder mob (dist, post, inf) RG RG grade 3 RG grade 3 RG grade 3 CRR CRR       Cervical distraction  RG           Sub-occipital release  RG           IASTM/ STM    R PEC         Cupping             Neural gliding    Median and radial nerve bias x15 Median n glide elbow/wrist 10x    Neck wrist 10x      Radial n glide elbow wrist 10x 10x L/R      10x  L/R 2 x 10 L/R      Radal 2 x 10 L/R 2 x 10 L/R    Radial    2 x 10  L/R     Cervical traction   Traction machine   10' 15#-20# Traction machine   10' 15#-20# Traction machine  12# 12'  # 15'    22# 15'  24#     Shay neural mobs     CRR with median n bias                     Neuro Re-Ed             EDU/HEP EDU on HEP, posture, sleeping position, and anatomy of condition RG RG RG CRR    Reviewed importance of head position, posture, body mechanics Reviewed posture and body mechanics Written HEP cervical stabilization with  "tbands, wall angels, nd nerve glides Reviewed HEP     Door way pec stretch     3x30\" holds ea R only          Bicep stretch standing at plinth    3x30\" holds ea R only          UT stretch   3x30\" holds ea b/l 3x30\" holds ea b/l          Lev scap stretch   3x30\" holds ea b/l 3x30\" holds ea b/l          SCM stretch   nv           Cervical retraction  Supine 15x5\" holds ea    Seated 10x hold 5\" 10x hold 5\" 10x hold 5\" 10x hold 5\"     Scap retractions  15x5\" holds ea 15x5\" holds ea   10x hold 5\" 10x hold 5\" 10x hold 5\"      Scap elevate/depress        10x hold 5\" on depress 10x hold 5\"     Scap protractions  X20 ea X20 ea X20 ea Supine B     2 x 10 2 x 10 2 x 10 -     PB neural glide  Green PB flex and ABD x20 ea  Green PB flex and ABD x20 ea          Cervical lat flx L/R  Rot L/R  flx     5x all hold 5\" 5x all hold 5\" 5x all hold 5\" 5x all  Hold 5\"                  Ther Ex             UBE  8' 1.5 FWD only 1'/1' total 10' L 1.5 1'/1' total 10' L 1.5 1'/1' total 4'  Then increase in R elbow symptoms 3'/3'  F/B L1 3'/3' -     B shoulder/elbow TB extension       10x /10x green TB hold 5\" 10x/10x green TB     Wall angels       10x 10x                                                                      Ther Activity                                       Gait Training                                       Modalities                                                          "

## 2025-07-23 ENCOUNTER — EVALUATION (OUTPATIENT)
Age: 70
End: 2025-07-23
Payer: COMMERCIAL

## 2025-07-23 DIAGNOSIS — M54.2 CERVICALGIA: ICD-10-CM

## 2025-07-23 DIAGNOSIS — M79.601 RIGHT ARM PAIN: Primary | ICD-10-CM

## 2025-07-23 PROCEDURE — 97012 MECHANICAL TRACTION THERAPY: CPT | Performed by: PHYSICAL THERAPIST

## 2025-07-23 PROCEDURE — 97112 NEUROMUSCULAR REEDUCATION: CPT | Performed by: PHYSICAL THERAPIST

## 2025-07-23 PROCEDURE — 97110 THERAPEUTIC EXERCISES: CPT | Performed by: PHYSICAL THERAPIST

## 2025-07-23 NOTE — PROGRESS NOTES
PT Re-Evaluation     Today's date: 2025  Patient name: Mike Villeda  : 1955  MRN: [id number]  Referring provider: Chantelle Argueta DO  Dx:   Encounter Diagnosis     ICD-10-CM    1. Right arm pain  M79.601       2. Cervicalgia  M54.2           Start Time: 0855  Stop Time: 0945  Total time in clinic (min): 50 minutes    Assessment  Impairments: abnormal or restricted ROM, activity intolerance, impaired physical strength, lacks appropriate home exercise program, pain with function, poor posture , participation limitations, activity limitations and endurance  Functional limitations: pain at night, limited ability to turn head at times, pain with prolonged sitting    Assessment details: Mike is a 70 y.o. male who presents to outpatient physical therapy with signs and symptoms consistent with cervical radiculopathy and R shoulder pain.  Pain is no longer constant and is pain free at times. Radiating pain intermittently to lateral upper arm, with paresthesias in median N distribution in B hands. + [place], and + Tinel R CT. Some improvement in neck ROM noted, however continues to need VC for postural awareness.  Pt's wife  1 week ago, so states understandable he has not focused on PT and HEP. B shoulders exhibit ROM deficits, L more so than R. Treatment of mechanical traction in PT has decreased pain and UE symptoms.  Arrangements for home [employer] traction unit have been made, and patient will be able to have traction daily as part of HEP.  Pt continues to have pain with prolonged sitting, at night but has noticed improved motion with driving.  Plan to continue PT with goal of decreasing pain, centralizing UE symptoms, and improving ROM in neck and shoulders, and function.    Physical therapist assistant (PTA) may be utilized to administer treatments as appropriate and in accordance with [facility] Physical [facility] Practice Act.    Understanding of Dx/Px/POC: fair     Prognosis:  good    Goals  STG: To be achieved in 4 weeks from 6/12/25:   1. Mike will report pain no worse than 6/10 at worst to indicate decreased pain level and improved participation with activity.(MET)  2. Mike will only have moderate restrictions into cervical AROM rotation to the R with minimal report of discomfort to allow patient to participate in functional tasks such as I/ADLs, driving, and functional mobility. (Progressing)  3. Mike will only have moderate restrictions into L/R cervical AROM lateral side bend to allow patient to participate in functional tasks such as I/ADLs, driving, and functional mobility. (progressing0  4. Mike will be more mindful regarding sitting and standing posture requiring verbal cueing 50% of the time from therapist to correct posture.(Progressing)  5. Mike will be independent with initial home exercise program.(MET)      LTG: To be achieved in 8 weeks from 6/12/25 or upon discharge from OPPT:   1. Mike will report pain no worse than 0-4/10 at worst to indicate decreased pain level and improved participation with activity.  2. Mike  will increase quality of movement into shoulder AROM flexion and abduction to WFL to allow patient to participate in functional tasks such as I/ADLs, overhead tasks, and functional mobility.   3. Mike will have pain free AROM of the R shoulder BTH to allow patient to participate in functional tasks such as dressing, grooming.  4. Mike will have minimal restrictions into cervical  AROM rotation with minimal to no pain to allow patient to participate in functional tasks such as I/ADLs, driving, and functional mobility.   5. Mike will have minimal restrictions into cervical AROM into lateral side bending with minimal to no pain to allow patient to participate in functional tasks such as I/ADLs, driving, and functional mobility.   6. Mike will demonstrate gross 4/5 strength in bilateral upper extremities for improved stability of joint and indicate  improvement in functional strength.    7. Mike will report 75% improvement in symptoms compared to start of POC to indicate functional improvement and decrease level of disability.    8. Mike will be independent with advanced home exercise program to allow patient to transition from physical therapy care to continuing with plan of care at home without supervision from therapist and continue to progress with rehabilitation.       Plan  Patient would benefit from: PT eval and skilled physical therapy  Planned modality interventions: biofeedback, cryotherapy, thermotherapy: hydrocollator packs, ultrasound and unattended electrical stimulation    Planned therapy interventions: IASTM, joint mobilization, kinesiology taping, massage, manual therapy, Pineda taping, nerve gliding, neuromuscular re-education, patient education, strengthening, stretching, therapeutic activities, therapeutic exercise, transfer training, home exercise program, graded exercise, graded activity, flexibility, functional ROM exercises, patient/caregiver education and postural training    Frequency: 1-2x week  Duration in weeks: 8  Plan of Care beginning date: 7/23/2025  Plan of Care expiration date: 9/17/2025  Treatment plan discussed with: patient        Subjective Evaluation    History of Present Illness  Mechanism of injury: 7/23/25-Pt notes improvement of neck and UE symptoms over past month.  Arrangements have been made to have a home [employer] Traction unit supplied.  Pain range 0-5/10.    Mike is a 70 y.o. male. They present to outpatient physical therapy with the primary complaint of neck and R UE pain. Reports tingling and numbness into the fingertips. They are referred to OPPT by Chantelle Argueta DO. Symptoms began following lifting a mower out of a  truck. Mike reports he thought he was having a heart attack and went to the ER. Reports symptoms were initially managed with muscle relaxers and pain medication. Pt is still  "actively taking gabapentin prn. Symptoms are still present just less intense. Reports sleep disturbance 1x per night. Reports difficulty/ increase difficulty with physical labor activities including cutting fire wood, donning pants, wiping, placing his arm behind his back. 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             Not a recurrent problem   Patient Goals  Patient goals for therapy: decreased pain, increased motion, increased strength, return to sport/leisure activities and independence with ADLs/IADLs  Patient goal: \"understand his diagnosis\"  Pain  Current pain ratin  At worst pain ratin  Quality: burning, dull ache, needle-like, radiating, throbbing, cramping, pressure, sharp, tight and discomfort  Relieving factors: medications, heat, ice, rest and change in position  Progression: improved    Social Support  Steps to enter house: no  Stairs in house: no   Lives in: multiple-level home  Lives with: alone    Employment status: not working (retired)  Hand dominance: right  Exercise history: playing the violin      Diagnostic Tests  X-ray: abnormal        Objective     Postural Observations  Seated posture: poor  Standing posture: poor  Correction of posture: unable to modify posture.    Additional Postural Observation Details  Thoracic kyphosis, excessive cervical protraction, rounded shoulders. Posture was not modifiable.     Tenderness     Additional Tenderness Details  Tender to palpate along the posterolateral aspect of the R shoulder and triceps    Neurological Testing     Sensation   Cervical/Thoracic   Left   Intact: light touch  Paresthesia: light touch    Right   Intact: light touch  Paresthesia: light touch    Additional Neurological Details  Paresthesias B hands in median N distribution; radiating pain to R lateral upper arm at times    Active Range of Motion   Cervical/Thoracic Spine       Cervical    Flexion: Neck active flexion: tightness along the R UT.  Restriction level: " minimal  Extension:  Restriction level: minimal  Left lateral flexion: 20 degrees     with pain Restriction level: moderate  Right lateral flexion: 13 degrees     with pain Restriction level maximal  Left rotation:  Restriction level: moderate  Right rotation:  with pain Restriction level: moderate  Left Shoulder   Flexion: 134 degrees   Abduction: 128 degrees     Right Shoulder   Flexion: 148 degrees   Abduction: 148 degrees     Scapular Mobility   Left Shoulder   Scapular mobility: poor  Scapular Mobility with Shoulder to 90° FF   Upward rotation: delayed and inadequate    Scapular Mobility beyond 90° FF   Upward rotation: inadequate and delayed    Right Shoulder   Scapular mobility: poor  Scapular Mobility with Shoulder to 90° FF   Upward rotation: delayed and inadequate    Scapular Mobility beyond 90° FF   Upward rotation: inadequate and delayed    Additional Scapular Mobility Details  Scapular winging b/l L>R shoulder ABD below 90 and above 90    Joint Play   Left Shoulder  Hypomobile in the anterior capsule, posterior capsule, inferior capsule and thoracic spine.    Right Shoulder  Hypomobile in the anterior capsule, posterior capsule, inferior capsule and thoracic spine.     Strength/Myotome Testing     Left Shoulder     Planes of Motion   Flexion: 4+   Abduction: 4+   External rotation at 0°: 4   Internal rotation at 0°: 4     Isolated Muscles   Biceps: 4+   Lower trapezius: 3   Middle trapezius: 3   Rhomboids: 3   Upper trapezius: 5     Right Shoulder     Planes of Motion   Flexion: 4+   Abduction: 4+   External rotation at 0°: 4   Internal rotation at 0°: 4-     Isolated Muscles   Biceps: 4   Lower trapezius: 3-   Middle trapezius: 3-   Rhomboids: 3-   Upper trapezius: 4+     Tests   Cervical     Left   Negative Spurling's Test A.     Right   Negative Spurling's Test A.     Left Shoulder   Negative drop arm, empty can, external rotation lag sign, full can, Hawkin's, lift-off, Neer's, ULTT1, ULTT2, ULTT3  "and ULTT4.     Right Shoulder   Positive Hawkin's, Neer's and painful arc.   Negative drop arm, empty can, external rotation lag sign, full can, internal rotation lag sign and lift-off.     Additional Tests Details  + [place] R, + Tinel R median nerve at carpal tunnel    General Comments:      Cervical/Thoracic Comments  7/23/25- NDI  14/50, 28% disability             Precautions: standard precautions        Date: 6/12 6/16 6/23 6/26 7/2 7/14 7/16 7/21 7/23    Visit #: 1 2 3 4 5 6 7 8 9 10   Manuals             R Shoulder mob (dist, post, inf) RG RG grade 3 RG grade 3 RG grade 3 CRR CRR       Cervical distraction  RG           Sub-occipital release  RG           IASTM/ STM    R PEC         Cupping             Neural gliding    Median and radial nerve bias x15 Median n glide elbow/wrist 10x    Neck wrist 10x      Radial n glide elbow wrist 10x 10x L/R      10x  L/R 2 x 10 L/R      Radal 2 x 10 L/R 2 x 10 L/R    Radial    2 x 10  L/R 2 x 10  L/R  radial    2 x 10  L/R    Cervical traction   Traction machine   10' 15#-20# Traction machine   10' 15#-20# Traction machine  12'  18# 12'  20# 15'    22# 15'  24# 15' 28#    Shay neural mobs     CRR with median n bias                 RE    Neuro Re-Ed             EDU/HEP EDU on HEP, posture, sleeping position, and anatomy of condition RG RG RG CRR    Reviewed importance of head position, posture, body mechanics Reviewed posture and body mechanics Written HEP cervical stabilization with tbands, wall angels, nd nerve glides Reviewed HEP     Door way pec stretch     3x30\" holds ea R only          Bicep stretch standing at plinth    3x30\" holds ea R only          UT stretch   3x30\" holds ea b/l 3x30\" holds ea b/l          Lev scap stretch   3x30\" holds ea b/l 3x30\" holds ea b/l          SCM stretch   nv           Cervical retraction  Supine 15x5\" holds ea    Seated 10x hold 5\" 10x hold 5\" 10x hold 5\" 10x hold 5\" 10x hold 5\"    Scap retractions  15x5\" holds ea 15x5\" holds ea   " "10x hold 5\" 10x hold 5\" 10x hold 5\"  5x hold 5\"    Scap elevate/depress        10x hold 5\" on depress 10x hold 5\" 10x hold 5\"    Scap protractions  X20 ea X20 ea X20 ea Supine B     2 x 10 2 x 10 2 x 10 -     Wand flx         5x hold 5\"    PB neural glide  Green PB flex and ABD x20 ea  Green PB flex and ABD x20 ea          Cervical lat flx L/R  Rot L/R  flx     5x all hold 5\" 5x all hold 5\" 5x all hold 5\" 5x all  Hold 5\" 5x all hold 5\"                 Ther Ex             UBE  8' 1.5 FWD only 1'/1' total 10' L 1.5 1'/1' total 10' L 1.5 1'/1' total 4'  Then increase in R elbow symptoms 3'/3'  F/B L1 3'/3' -     B shoulder/elbow TB extension       10x /10x green TB hold 5\" 10x/10x green TB 10x/10x  green TB    Wall angels       10x 10x 10x hold 5\"    B anterior shoulder pect stretch/corner         3x hold 10\"                                                        Ther Activity                                       Gait Training                                       Modalities                                                          "

## 2025-07-23 NOTE — LETTER
2025    Chantelle Argueta DO  [address]  [address]  [facility]     Patient: Mike Villeda   YOB: 1955   Date of Visit: 2025     Encounter Diagnosis     ICD-10-CM    1. Right arm pain  M79.601       2. Cervicalgia  M54.2           Dear Dr. Chantelle Argueta, DO:    Thank you for your recent referral of Mike Villeda. Please review the attached evaluation summary from Mike's recent visit.     Please verify that you agree with the plan of care by signing the attached order.     If you have any questions or concerns, please do not hesitate to call.     I sincerely appreciate the opportunity to share in the care of one of your patients and hope to have another opportunity to work with you in the near future.       Sincerely,    Kelly Bhandari, PT      Referring Provider:      I certify that I have read the below Plan of Care and certify the need for these services furnished under this plan of treatment while under my care.                    Chantelle Argueta DO  [address]  [address] [place] [address]   Via Fax: [phone]          PT Re-Evaluation     Today's date: 2025  Patient name: Mike Villeda  : 1955  MRN: [id number]  Referring provider: Chantelle Argueta DO  Dx:   Encounter Diagnosis     ICD-10-CM    1. Right arm pain  M79.601       2. Cervicalgia  M54.2           Start Time: 0855  Stop Time: 0945  Total time in clinic (min): 50 minutes    Assessment  Impairments: abnormal or restricted ROM, activity intolerance, impaired physical strength, lacks appropriate home exercise program, pain with function, poor posture , participation limitations, activity limitations and endurance  Functional limitations: pain at night, limited ability to turn head at times, pain with prolonged sitting    Assessment details: Mike is a 70 y.o. male who presents to outpatient physical therapy with signs and symptoms consistent with cervical radiculopathy and R  shoulder pain.  Pain is no longer constant and is pain free at times. Radiating pain intermittently to lateral upper arm, with paresthesias in median N distribution in B hands. + [place], and + Tinel R CT. Some improvement in neck ROM noted, however continues to need VC for postural awareness.  Pt's wife  1 week ago, so states understandable he has not focused on PT and HEP. B shoulders exhibit ROM deficits, L more so than R. Treatment of mechanical traction in PT has decreased pain and UE symptoms.  Arrangements for home [employer] traction unit have been made, and patient will be able to have traction daily as part of HEP.  Pt continues to have pain with prolonged sitting, at night but has noticed improved motion with driving.  Plan to continue PT with goal of decreasing pain, centralizing UE symptoms, and improving ROM in neck and shoulders, and function.    Physical therapist assistant (PTA) may be utilized to administer treatments as appropriate and in accordance with [facility] Physical Therapy Practice Act.    Understanding of Dx/Px/POC: fair     Prognosis: good    Goals  STG: To be achieved in 4 weeks from 25:   1. Mike will report pain no worse than 6/10 at worst to indicate decreased pain level and improved participation with activity.(MET)  2. Mike will only have moderate restrictions into cervical AROM rotation to the R with minimal report of discomfort to allow patient to participate in functional tasks such as I/ADLs, driving, and functional mobility. (Progressing)  3. Mike will only have moderate restrictions into L/R cervical AROM lateral side bend to allow patient to participate in functional tasks such as I/ADLs, driving, and functional mobility. (progressing0  4. Mike will be more mindful regarding sitting and standing posture requiring verbal cueing 50% of the time from therapist to correct posture.(Progressing)  5. Mike will be independent with initial home exercise  program.(MET)      LTG: To be achieved in 8 weeks from 6/12/25 or upon discharge from OPPT:   1. Mike will report pain no worse than 0-4/10 at worst to indicate decreased pain level and improved participation with activity.  2. Mike  will increase quality of movement into shoulder AROM flexion and abduction to WFL to allow patient to participate in functional tasks such as I/ADLs, overhead tasks, and functional mobility.   3. Mike will have pain free AROM of the R shoulder BTH to allow patient to participate in functional tasks such as dressing, grooming.  4. Mike will have minimal restrictions into cervical  AROM rotation with minimal to no pain to allow patient to participate in functional tasks such as I/ADLs, driving, and functional mobility.   5. Mike will have minimal restrictions into cervical AROM into lateral side bending with minimal to no pain to allow patient to participate in functional tasks such as I/ADLs, driving, and functional mobility.   6. Mike will demonstrate gross 4/5 strength in bilateral upper extremities for improved stability of joint and indicate improvement in functional strength.    7. Mike will report 75% improvement in symptoms compared to start of POC to indicate functional improvement and decrease level of disability.    8. Mike will be independent with advanced home exercise program to allow patient to transition from physical therapy care to continuing with plan of care at home without supervision from therapist and continue to progress with rehabilitation.       Plan  Patient would benefit from: PT eval and skilled physical therapy  Planned modality interventions: biofeedback, cryotherapy, thermotherapy: hydrocollator packs, ultrasound and unattended electrical stimulation    Planned therapy interventions: IASTM, joint mobilization, kinesiology taping, massage, manual therapy, Pineda taping, nerve gliding, neuromuscular re-education, patient education, strengthening,  "stretching, therapeutic activities, therapeutic exercise, transfer training, home exercise program, graded exercise, graded activity, flexibility, functional ROM exercises, patient/caregiver education and postural training    Frequency: 1-2x week  Duration in weeks: 8  Plan of Care beginning date: 2025  Plan of Care expiration date: 2025  Treatment plan discussed with: patient        Subjective Evaluation    History of Present Illness  Mechanism of injury: 25-Pt notes improvement of neck and UE symptoms over past month.  Arrangements have been made to have a home [employer] Traction unit supplied.  Pain range 0-5/10.    Mike is a 70 y.o. male. They present to outpatient physical therapy with the primary complaint of neck and R UE pain. Reports tingling and numbness into the fingertips. They are referred to OPPT by Chantelle Argueta DO. Symptoms began following lifting a mower out of a  truck. Mike reports he thought he was having a heart attack and went to the ER. Reports symptoms were initially managed with muscle relaxers and pain medication. Pt is still actively taking gabapentin prn. Symptoms are still present just less intense. Reports sleep disturbance 1x per night. Reports difficulty/ increase difficulty with physical labor activities including cutting fire wood, donning pants, wiping, placing his arm behind his back. 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             Not a recurrent problem   Patient Goals  Patient goals for therapy: decreased pain, increased motion, increased strength, return to sport/leisure activities and independence with ADLs/IADLs  Patient goal: \"understand his diagnosis\"  Pain  Current pain ratin  At worst pain ratin  Quality: burning, dull ache, needle-like, radiating, throbbing, cramping, pressure, sharp, tight and discomfort  Relieving factors: medications, heat, ice, rest and change in position  Progression: improved    Social Support  Steps to " enter house: no  Stairs in house: no   Lives in: multiple-level home  Lives with: alone    Employment status: not working (retired)  Hand dominance: right  Exercise history: playing the violin      Diagnostic Tests  X-ray: abnormal        Objective     Postural Observations  Seated posture: poor  Standing posture: poor  Correction of posture: unable to modify posture.    Additional Postural Observation Details  Thoracic kyphosis, excessive cervical protraction, rounded shoulders. Posture was not modifiable.     Tenderness     Additional Tenderness Details  Tender to palpate along the posterolateral aspect of the R shoulder and triceps    Neurological Testing     Sensation   Cervical/Thoracic   Left   Intact: light touch  Paresthesia: light touch    Right   Intact: light touch  Paresthesia: light touch    Additional Neurological Details  Paresthesias B hands in median N distribution; radiating pain to R lateral upper arm at times    Active Range of Motion   Cervical/Thoracic Spine       Cervical    Flexion: Neck active flexion: tightness along the R UT.  Restriction level: minimal  Extension:  Restriction level: minimal  Left lateral flexion: 20 degrees     with pain Restriction level: moderate  Right lateral flexion: 13 degrees     with pain Restriction level maximal  Left rotation:  Restriction level: moderate  Right rotation:  with pain Restriction level: moderate  Left Shoulder   Flexion: 134 degrees   Abduction: 128 degrees     Right Shoulder   Flexion: 148 degrees   Abduction: 148 degrees     Scapular Mobility   Left Shoulder   Scapular mobility: poor  Scapular Mobility with Shoulder to 90° FF   Upward rotation: delayed and inadequate    Scapular Mobility beyond 90° FF   Upward rotation: inadequate and delayed    Right Shoulder   Scapular mobility: poor  Scapular Mobility with Shoulder to 90° FF   Upward rotation: delayed and inadequate    Scapular Mobility beyond 90° FF   Upward rotation: inadequate and  delayed    Additional Scapular Mobility Details  Scapular winging b/l L>R shoulder ABD below 90 and above 90    Joint Play   Left Shoulder  Hypomobile in the anterior capsule, posterior capsule, inferior capsule and thoracic spine.    Right Shoulder  Hypomobile in the anterior capsule, posterior capsule, inferior capsule and thoracic spine.     Strength/Myotome Testing     Left Shoulder     Planes of Motion   Flexion: 4+   Abduction: 4+   External rotation at 0°: 4   Internal rotation at 0°: 4     Isolated Muscles   Biceps: 4+   Lower trapezius: 3   Middle trapezius: 3   Rhomboids: 3   Upper trapezius: 5     Right Shoulder     Planes of Motion   Flexion: 4+   Abduction: 4+   External rotation at 0°: 4   Internal rotation at 0°: 4-     Isolated Muscles   Biceps: 4   Lower trapezius: 3-   Middle trapezius: 3-   Rhomboids: 3-   Upper trapezius: 4+     Tests   Cervical     Left   Negative Spurling's Test A.     Right   Negative Spurling's Test A.     Left Shoulder   Negative drop arm, empty can, external rotation lag sign, full can, Hawkin's, lift-off, Neer's, ULTT1, ULTT2, ULTT3 and ULTT4.     Right Shoulder   Positive Hawkin's, Neer's and painful arc.   Negative drop arm, empty can, external rotation lag sign, full can, internal rotation lag sign and lift-off.     Additional Tests Details  + [place] R, + Tinel R median nerve at carpal tunnel    General Comments:      Cervical/Thoracic Comments  7/23/25- NDI  14/50, 28% disability             Precautions: standard precautions        Date: 6/12 6/16 6/23 6/26 7/2 7/14 7/16 7/21 7/23    Visit #: 1 2 3 4 5 6 7 8 9 10   Manuals             R Shoulder mob (dist, post, inf) RG RG grade 3 RG grade 3 RG grade 3 CRR CRR       Cervical distraction  RG           Sub-occipital release  RG           IASTM/ STM    R PEC         Cupping             Neural gliding    Median and radial nerve bias x15 Median n glide elbow/wrist 10x    Neck wrist 10x      Radial n glide elbow wrist 10x  "10x L/R      10x  L/R 2 x 10 L/R      Radal 2 x 10 L/R 2 x 10 L/R    Radial    2 x 10  L/R 2 x 10  L/R  radial    2 x 10  L/R    Cervical traction   Traction machine   10' 15#-20# Traction machine   10' 15#-20# Traction machine  12'  18# 12'  20# 15'    22# 15'  24# 15' 28#    Shay neural mobs     CRR with median n bias                 RE    Neuro Re-Ed             EDU/HEP EDU on HEP, posture, sleeping position, and anatomy of condition RG RG RG CRR    Reviewed importance of head position, posture, body mechanics Reviewed posture and body mechanics Written HEP cervical stabilization with tbands, wall angels, nd nerve glides Reviewed HEP     Door way pec stretch     3x30\" holds ea R only          Bicep stretch standing at plinth    3x30\" holds ea R only          UT stretch   3x30\" holds ea b/l 3x30\" holds ea b/l          Lev scap stretch   3x30\" holds ea b/l 3x30\" holds ea b/l          SCM stretch   nv           Cervical retraction  Supine 15x5\" holds ea    Seated 10x hold 5\" 10x hold 5\" 10x hold 5\" 10x hold 5\" 10x hold 5\"    Scap retractions  15x5\" holds ea 15x5\" holds ea   10x hold 5\" 10x hold 5\" 10x hold 5\"  5x hold 5\"    Scap elevate/depress        10x hold 5\" on depress 10x hold 5\" 10x hold 5\"    Scap protractions  X20 ea X20 ea X20 ea Supine B     2 x 10 2 x 10 2 x 10 -     Wand flx         5x hold 5\"    PB neural glide  Green PB flex and ABD x20 ea  Green PB flex and ABD x20 ea          Cervical lat flx L/R  Rot L/R  flx     5x all hold 5\" 5x all hold 5\" 5x all hold 5\" 5x all  Hold 5\" 5x all hold 5\"                 Ther Ex             UBE  8' 1.5 FWD only 1'/1' total 10' L 1.5 1'/1' total 10' L 1.5 1'/1' total 4'  Then increase in R elbow symptoms 3'/3'  F/B L1 3'/3' -     B shoulder/elbow TB extension       10x /10x green TB hold 5\" 10x/10x green TB 10x/10x  green TB    Wall angels       10x 10x 10x hold 5\"    B anterior shoulder pect stretch/corner         3x hold 10\"                                        "                 Ther Activity                                       Gait Training                                       Modalities

## 2025-07-28 ENCOUNTER — OFFICE VISIT (OUTPATIENT)
Age: 70
End: 2025-07-28
Payer: COMMERCIAL

## 2025-07-28 DIAGNOSIS — M79.601 RIGHT ARM PAIN: Primary | ICD-10-CM

## 2025-07-28 DIAGNOSIS — M54.2 CERVICALGIA: ICD-10-CM

## 2025-07-28 PROCEDURE — 97112 NEUROMUSCULAR REEDUCATION: CPT | Performed by: PHYSICAL THERAPIST

## 2025-07-28 PROCEDURE — 97110 THERAPEUTIC EXERCISES: CPT | Performed by: PHYSICAL THERAPIST

## 2025-07-28 PROCEDURE — 97012 MECHANICAL TRACTION THERAPY: CPT | Performed by: PHYSICAL THERAPIST

## 2025-07-30 ENCOUNTER — OFFICE VISIT (OUTPATIENT)
Age: 70
End: 2025-07-30
Payer: COMMERCIAL

## 2025-07-30 DIAGNOSIS — M54.2 CERVICALGIA: ICD-10-CM

## 2025-07-30 DIAGNOSIS — M79.601 RIGHT ARM PAIN: Primary | ICD-10-CM

## 2025-07-30 PROCEDURE — 97112 NEUROMUSCULAR REEDUCATION: CPT | Performed by: PHYSICAL THERAPIST

## 2025-07-30 PROCEDURE — 97110 THERAPEUTIC EXERCISES: CPT | Performed by: PHYSICAL THERAPIST
